# Patient Record
Sex: FEMALE | Race: WHITE | Employment: OTHER | URBAN - NONMETROPOLITAN AREA
[De-identification: names, ages, dates, MRNs, and addresses within clinical notes are randomized per-mention and may not be internally consistent; named-entity substitution may affect disease eponyms.]

---

## 2018-09-14 ENCOUNTER — APPOINTMENT (OUTPATIENT)
Dept: GENERAL RADIOLOGY | Age: 78
DRG: 640 | End: 2018-09-14
Payer: MEDICARE

## 2018-09-14 ENCOUNTER — HOSPITAL ENCOUNTER (INPATIENT)
Age: 78
LOS: 3 days | Discharge: NURSING, REHAB OR LONG-TERM CARE | DRG: 640 | End: 2018-09-17
Attending: INTERNAL MEDICINE | Admitting: INTERNAL MEDICINE
Payer: MEDICARE

## 2018-09-14 ENCOUNTER — APPOINTMENT (OUTPATIENT)
Dept: CT IMAGING | Age: 78
DRG: 640 | End: 2018-09-14
Payer: MEDICARE

## 2018-09-14 DIAGNOSIS — E86.0 DEHYDRATION: ICD-10-CM

## 2018-09-14 DIAGNOSIS — G93.41 METABOLIC ENCEPHALOPATHY: ICD-10-CM

## 2018-09-14 DIAGNOSIS — R41.82 ALTERED MENTAL STATUS, UNSPECIFIED ALTERED MENTAL STATUS TYPE: Primary | ICD-10-CM

## 2018-09-14 DIAGNOSIS — N39.0 URINARY TRACT INFECTION WITHOUT HEMATURIA, SITE UNSPECIFIED: ICD-10-CM

## 2018-09-14 PROBLEM — F02.80 ALZHEIMER'S DEMENTIA (HCC): Chronic | Status: ACTIVE | Noted: 2018-09-14

## 2018-09-14 PROBLEM — G30.9 ALZHEIMER'S DEMENTIA (HCC): Status: ACTIVE | Noted: 2018-09-14

## 2018-09-14 PROBLEM — R79.89 PRERENAL AZOTEMIA: Status: ACTIVE | Noted: 2018-09-14

## 2018-09-14 PROBLEM — G30.9 ALZHEIMER'S DEMENTIA (HCC): Chronic | Status: ACTIVE | Noted: 2018-09-14

## 2018-09-14 PROBLEM — F02.80 ALZHEIMER'S DEMENTIA (HCC): Status: ACTIVE | Noted: 2018-09-14

## 2018-09-14 LAB
-: ABNORMAL
ABSOLUTE EOS #: 0.44 K/UL (ref 0–0.44)
ABSOLUTE IMMATURE GRANULOCYTE: <0.03 K/UL (ref 0–0.3)
ABSOLUTE LYMPH #: 1.85 K/UL (ref 1.1–3.7)
ABSOLUTE MONO #: 0.44 K/UL (ref 0.1–1.2)
ALBUMIN SERPL-MCNC: 3.6 G/DL (ref 3.5–5.2)
ALBUMIN/GLOBULIN RATIO: 1.4 (ref 1–2.5)
ALP BLD-CCNC: 82 U/L (ref 35–104)
ALT SERPL-CCNC: 7 U/L (ref 5–33)
AMORPHOUS: ABNORMAL
ANION GAP SERPL CALCULATED.3IONS-SCNC: 7 MMOL/L (ref 9–17)
AST SERPL-CCNC: 12 U/L
BACTERIA: ABNORMAL
BASOPHILS # BLD: 1 % (ref 0–2)
BASOPHILS ABSOLUTE: 0.06 K/UL (ref 0–0.2)
BILIRUB SERPL-MCNC: 0.36 MG/DL (ref 0.3–1.2)
BILIRUBIN URINE: NEGATIVE
BUN BLDV-MCNC: 30 MG/DL (ref 8–23)
BUN/CREAT BLD: 28 (ref 9–20)
CALCIUM SERPL-MCNC: 9.2 MG/DL (ref 8.6–10.4)
CASTS UA: ABNORMAL /LPF
CHLORIDE BLD-SCNC: 109 MMOL/L (ref 98–107)
CO2: 32 MMOL/L (ref 20–31)
COLOR: YELLOW
COMMENT UA: ABNORMAL
CREAT SERPL-MCNC: 1.08 MG/DL (ref 0.5–0.9)
CRYSTALS, UA: ABNORMAL /HPF
DIFFERENTIAL TYPE: ABNORMAL
EOSINOPHILS RELATIVE PERCENT: 7 % (ref 1–4)
EPITHELIAL CELLS UA: ABNORMAL /HPF (ref 0–25)
GFR AFRICAN AMERICAN: 59 ML/MIN
GFR NON-AFRICAN AMERICAN: 49 ML/MIN
GFR SERPL CREATININE-BSD FRML MDRD: ABNORMAL ML/MIN/{1.73_M2}
GFR SERPL CREATININE-BSD FRML MDRD: ABNORMAL ML/MIN/{1.73_M2}
GLUCOSE BLD-MCNC: 86 MG/DL (ref 70–99)
GLUCOSE URINE: NEGATIVE
HCT VFR BLD CALC: 39.7 % (ref 36.3–47.1)
HEMOGLOBIN: 12.5 G/DL (ref 11.9–15.1)
IMMATURE GRANULOCYTES: 0 %
KETONES, URINE: NEGATIVE
LACTIC ACID, WHOLE BLOOD: NORMAL MMOL/L (ref 0.7–2.1)
LACTIC ACID: 0.9 MMOL/L (ref 0.5–2.2)
LEUKOCYTE ESTERASE, URINE: ABNORMAL
LYMPHOCYTES # BLD: 30 % (ref 24–43)
MCH RBC QN AUTO: 31.1 PG (ref 25.2–33.5)
MCHC RBC AUTO-ENTMCNC: 31.5 G/DL (ref 28.4–34.8)
MCV RBC AUTO: 98.8 FL (ref 82.6–102.9)
MONOCYTES # BLD: 7 % (ref 3–12)
MUCUS: ABNORMAL
NITRITE, URINE: POSITIVE
NRBC AUTOMATED: 0 PER 100 WBC
OTHER OBSERVATIONS UA: ABNORMAL
PDW BLD-RTO: 13 % (ref 11.8–14.4)
PH UA: 6.5 (ref 5–9)
PLATELET # BLD: 227 K/UL (ref 138–453)
PLATELET ESTIMATE: ABNORMAL
PMV BLD AUTO: 11.7 FL (ref 8.1–13.5)
POTASSIUM SERPL-SCNC: 4.1 MMOL/L (ref 3.7–5.3)
PROTEIN UA: NEGATIVE
RBC # BLD: 4.02 M/UL (ref 3.95–5.11)
RBC # BLD: ABNORMAL 10*6/UL
RBC UA: ABNORMAL /HPF (ref 0–2)
RENAL EPITHELIAL, UA: ABNORMAL /HPF
SEG NEUTROPHILS: 55 % (ref 36–65)
SEGMENTED NEUTROPHILS ABSOLUTE COUNT: 3.39 K/UL (ref 1.5–8.1)
SODIUM BLD-SCNC: 148 MMOL/L (ref 135–144)
SPECIFIC GRAVITY UA: 1.02 (ref 1.01–1.02)
TOTAL PROTEIN: 6.1 G/DL (ref 6.4–8.3)
TRICHOMONAS: ABNORMAL
TROPONIN INTERP: NORMAL
TROPONIN T: <0.03 NG/ML
TURBIDITY: ABNORMAL
URINE HGB: ABNORMAL
UROBILINOGEN, URINE: NORMAL
WBC # BLD: 6.2 K/UL (ref 3.5–11.3)
WBC # BLD: ABNORMAL 10*3/UL
WBC UA: ABNORMAL /HPF (ref 0–5)
YEAST: ABNORMAL

## 2018-09-14 PROCEDURE — 70450 CT HEAD/BRAIN W/O DYE: CPT

## 2018-09-14 PROCEDURE — 84484 ASSAY OF TROPONIN QUANT: CPT

## 2018-09-14 PROCEDURE — 87086 URINE CULTURE/COLONY COUNT: CPT

## 2018-09-14 PROCEDURE — 6360000002 HC RX W HCPCS: Performed by: INTERNAL MEDICINE

## 2018-09-14 PROCEDURE — 2580000003 HC RX 258: Performed by: INTERNAL MEDICINE

## 2018-09-14 PROCEDURE — 71045 X-RAY EXAM CHEST 1 VIEW: CPT

## 2018-09-14 PROCEDURE — 99285 EMERGENCY DEPT VISIT HI MDM: CPT

## 2018-09-14 PROCEDURE — 85025 COMPLETE CBC W/AUTO DIFF WBC: CPT

## 2018-09-14 PROCEDURE — 36415 COLL VENOUS BLD VENIPUNCTURE: CPT

## 2018-09-14 PROCEDURE — 6360000002 HC RX W HCPCS: Performed by: PHYSICIAN ASSISTANT

## 2018-09-14 PROCEDURE — 2580000003 HC RX 258: Performed by: PHYSICIAN ASSISTANT

## 2018-09-14 PROCEDURE — 96360 HYDRATION IV INFUSION INIT: CPT

## 2018-09-14 PROCEDURE — 1200000000 HC SEMI PRIVATE

## 2018-09-14 PROCEDURE — 81001 URINALYSIS AUTO W/SCOPE: CPT

## 2018-09-14 PROCEDURE — 93005 ELECTROCARDIOGRAM TRACING: CPT

## 2018-09-14 PROCEDURE — 87186 SC STD MICRODIL/AGAR DIL: CPT

## 2018-09-14 PROCEDURE — 80053 COMPREHEN METABOLIC PANEL: CPT

## 2018-09-14 PROCEDURE — 6370000000 HC RX 637 (ALT 250 FOR IP): Performed by: INTERNAL MEDICINE

## 2018-09-14 PROCEDURE — 87040 BLOOD CULTURE FOR BACTERIA: CPT

## 2018-09-14 PROCEDURE — 82947 ASSAY GLUCOSE BLOOD QUANT: CPT

## 2018-09-14 PROCEDURE — 87077 CULTURE AEROBIC IDENTIFY: CPT

## 2018-09-14 PROCEDURE — 83605 ASSAY OF LACTIC ACID: CPT

## 2018-09-14 RX ORDER — SODIUM CHLORIDE 0.9 % (FLUSH) 0.9 %
10 SYRINGE (ML) INJECTION EVERY 12 HOURS SCHEDULED
Status: DISCONTINUED | OUTPATIENT
Start: 2018-09-14 | End: 2018-09-17 | Stop reason: HOSPADM

## 2018-09-14 RX ORDER — PAROXETINE HYDROCHLORIDE 20 MG/1
20 TABLET, FILM COATED ORAL EVERY MORNING
COMMUNITY

## 2018-09-14 RX ORDER — DONEPEZIL HYDROCHLORIDE 10 MG/1
10 TABLET, ORALLY DISINTEGRATING ORAL DAILY
COMMUNITY

## 2018-09-14 RX ORDER — DONEPEZIL HYDROCHLORIDE 5 MG/1
10 TABLET, FILM COATED ORAL DAILY
Status: DISCONTINUED | OUTPATIENT
Start: 2018-09-15 | End: 2018-09-17 | Stop reason: HOSPADM

## 2018-09-14 RX ORDER — SODIUM CHLORIDE 0.9 % (FLUSH) 0.9 %
10 SYRINGE (ML) INJECTION PRN
Status: DISCONTINUED | OUTPATIENT
Start: 2018-09-14 | End: 2018-09-17 | Stop reason: HOSPADM

## 2018-09-14 RX ORDER — SODIUM CHLORIDE 9 MG/ML
INJECTION, SOLUTION INTRAVENOUS CONTINUOUS
Status: DISCONTINUED | OUTPATIENT
Start: 2018-09-14 | End: 2018-09-15

## 2018-09-14 RX ORDER — QUETIAPINE FUMARATE 25 MG/1
25 TABLET, FILM COATED ORAL 2 TIMES DAILY
COMMUNITY

## 2018-09-14 RX ORDER — MEMANTINE HYDROCHLORIDE 10 MG/1
28 TABLET ORAL DAILY
Status: DISCONTINUED | OUTPATIENT
Start: 2018-09-15 | End: 2018-09-14 | Stop reason: CLARIF

## 2018-09-14 RX ORDER — QUETIAPINE FUMARATE 25 MG/1
25 TABLET, FILM COATED ORAL
Status: DISCONTINUED | OUTPATIENT
Start: 2018-09-14 | End: 2018-09-17 | Stop reason: HOSPADM

## 2018-09-14 RX ORDER — MEMANTINE HYDROCHLORIDE 10 MG/1
10 TABLET ORAL 2 TIMES DAILY
Status: DISCONTINUED | OUTPATIENT
Start: 2018-09-14 | End: 2018-09-17 | Stop reason: HOSPADM

## 2018-09-14 RX ORDER — FAMOTIDINE 20 MG/1
20 TABLET, FILM COATED ORAL DAILY
Status: DISCONTINUED | OUTPATIENT
Start: 2018-09-15 | End: 2018-09-17

## 2018-09-14 RX ORDER — OXYBUTYNIN CHLORIDE 5 MG/1
5 TABLET, EXTENDED RELEASE ORAL DAILY
COMMUNITY

## 2018-09-14 RX ORDER — CYANOCOBALAMIN (VITAMIN B-12) 1000 MCG
1000 TABLET, EXTENDED RELEASE ORAL DAILY
COMMUNITY

## 2018-09-14 RX ORDER — MEMANTINE HYDROCHLORIDE 10 MG/1
28 TABLET ORAL DAILY
Status: ON HOLD | COMMUNITY
End: 2018-11-12 | Stop reason: CLARIF

## 2018-09-14 RX ORDER — OXYBUTYNIN CHLORIDE 5 MG/1
5 TABLET, EXTENDED RELEASE ORAL DAILY
Status: DISCONTINUED | OUTPATIENT
Start: 2018-09-15 | End: 2018-09-17 | Stop reason: HOSPADM

## 2018-09-14 RX ORDER — CIPROFLOXACIN 2 MG/ML
400 INJECTION, SOLUTION INTRAVENOUS EVERY 12 HOURS
Status: DISCONTINUED | OUTPATIENT
Start: 2018-09-14 | End: 2018-09-17 | Stop reason: HOSPADM

## 2018-09-14 RX ORDER — ONDANSETRON 2 MG/ML
4 INJECTION INTRAMUSCULAR; INTRAVENOUS EVERY 6 HOURS PRN
Status: DISCONTINUED | OUTPATIENT
Start: 2018-09-14 | End: 2018-09-17 | Stop reason: HOSPADM

## 2018-09-14 RX ORDER — SODIUM CHLORIDE 9 MG/ML
INJECTION, SOLUTION INTRAVENOUS CONTINUOUS
Status: DISCONTINUED | OUTPATIENT
Start: 2018-09-14 | End: 2018-09-17 | Stop reason: HOSPADM

## 2018-09-14 RX ORDER — PAROXETINE HYDROCHLORIDE 20 MG/1
20 TABLET, FILM COATED ORAL EVERY MORNING
Status: DISCONTINUED | OUTPATIENT
Start: 2018-09-15 | End: 2018-09-17 | Stop reason: HOSPADM

## 2018-09-14 RX ADMIN — QUETIAPINE FUMARATE 25 MG: 25 TABLET ORAL at 23:37

## 2018-09-14 RX ADMIN — SODIUM CHLORIDE: 9 INJECTION, SOLUTION INTRAVENOUS at 23:39

## 2018-09-14 RX ADMIN — SODIUM CHLORIDE: 9 INJECTION, SOLUTION INTRAVENOUS at 19:29

## 2018-09-14 RX ADMIN — CEFTRIAXONE 1 G: 1 INJECTION, POWDER, FOR SOLUTION INTRAMUSCULAR; INTRAVENOUS at 21:33

## 2018-09-14 RX ADMIN — CIPROFLOXACIN 400 MG: 2 INJECTION, SOLUTION INTRAVENOUS at 23:37

## 2018-09-14 RX ADMIN — MEMANTINE HYDROCHLORIDE 10 MG: 10 TABLET, FILM COATED ORAL at 23:37

## 2018-09-14 NOTE — ED NOTES
Bed:  01A  Expected date:   Expected time:   Means of arrival:   Comments:  Unresponsive       Balaji Koo RN  09/14/18 6757

## 2018-09-14 NOTE — ED PROVIDER NOTES
[facility] ED  eMERGENCY dEPARTMENT eNCOUnter      Pt Name: Carol Roman  MRN: [id number]  [place] 1940  Date of evaluation: 9/14/2018  Provider: Elisabeth Green PA-C,    CHIEF COMPLAINT       Chief Complaint   Patient presents with    Altered Mental Status      told family last known well last night, fighting when attempting to open eyes       HISTORY OF [address] Cliff Smith is a 66 y.o. female who presents to the emergency department from home EMS as a history dementia went to bed last evening at in her normal self this morning patient was unresponsive. EMS states when they got there patient would open her eyes but only stares straight ahead would not track anybody in the room. No other symptomology is not this time. Triage notes and Nursing notes were reviewed by myself. Any discrepancies are addressed above. PAST MEDICAL HISTORY     Past Medical History:   Diagnosis Date    Alzheimer's dementia        SURGICAL HISTORY       Past Surgical History:   Procedure Laterality Date    APPENDECTOMY      KNEE ARTHROSCOPY         CURRENT MEDICATIONS       Previous Medications    CYANOCOBALAMIN (VITAMIN B-12) 1000 MCG EXTENDED RELEASE TABLET    Take 1,000 mcg by mouth daily    DONEPEZIL (ARICEPT ODT) 10 MG DISINTEGRATING TABLET    Take 10 mg by mouth daily    MEMANTINE (NAMENDA) 10 MG TABLET    Take 28 mg by mouth daily    OXYBUTYNIN (DITROPAN-XL) 5 MG EXTENDED RELEASE TABLET    Take 5 mg by mouth daily    PAROXETINE (PAXIL) 20 MG TABLET    Take 20 mg by mouth every morning    QUETIAPINE (SEROQUEL) 25 MG TABLET    Take 25 mg by mouth Daily with supper       ALLERGIES     Penicillins    FAMILY HISTORY     History reviewed. No pertinent family history.      SOCIAL HISTORY       Social History     Social History    Marital status:      Spouse name: N/A    Number of children: N/A    Years of education: N/A     Social History Main Topics    Smoking status: Former Smoker    Smokeless tobacco: Never Used    Alcohol use No    Drug use: No    Sexual activity: Not Asked     Other Topics Concern    None     Social History Narrative    None       REVIEW OF SYSTEMS       Review of Systems   Unable to perform ROS: Dementia   Neurological:        See HPI     Review of systems as in HPI & PMH otherwise negative    PHYSICAL EXAM    (up to 7 for level 4, 8 or more for level 5)     ED Triage Vitals [09/14/18 1652]   BP Temp Temp src Pulse Resp SpO2 Height Weight   (!) 193/77 97.8 °F (36.6 °C) -- 58 19 95 % -- --       Physical Exam   Constitutional: She appears well-developed and well-nourished. HENT:   Head: Normocephalic and atraumatic. Dry oral mucosa   Eyes: Conjunctivae are normal.   Neck: Normal range of motion. Neck supple. Cardiovascular: Normal rate and regular rhythm. Pulmonary/Chest: Effort normal and breath sounds normal.   Neurological:   Patient will open her eyes and noxious stimuli   Skin: Skin is warm and dry. Nursing note and vitals reviewed. DIAGNOSTIC RESULTS     EKG: (none if blank)  All EKG's are interpreted by the Emergency Department Physician who either signs or Co-signs this chart in the absence of a cardiologist.    Normal sinus rhythm without ischemic changes    RADIOLOGY: (none if blank)   Interpretation per the Radiologist below, if available at the time of this note:    CT Head WO Contrast   Final Result   Nonspecific high density in the basilar terminus. This may be artifact or   calcification. A small amount of thrombus would be difficult to exclude   given the patient's history of clinical suspicion for stroke. CT angiogram   may be helpful      No acute intracranial abnormality otherwise noted      Critical results were called by Dr. Stacia Toscano to Radha Santos on 9/14/2018   at 19:30. XR CHEST 1 VW   Final Result   No acute findings.              LABS:  Labs Reviewed   CBC WITH AUTO DIFFERENTIAL - Abnormal; Notable for the following:        Result Value    Eosinophils % 7 (*)     All other components within normal limits   COMPREHENSIVE METABOLIC PANEL - Abnormal; Notable for the following:     BUN 30 (*)     CREATININE 1.08 (*)     Bun/Cre Ratio 28 (*)     Sodium 148 (*)     Chloride 109 (*)     CO2 32 (*)     Anion Gap 7 (*)     Total Protein 6.1 (*)     GFR Non- 49 (*)     GFR  59 (*)     All other components within normal limits   URINALYSIS - Abnormal; Notable for the following:     Turbidity UA SLIGHTLY CLOUDY (*)     Urine Hgb TRACE (*)     Nitrite, Urine POSITIVE (*)     Leukocyte Esterase, Urine SMALL (*)     All other components within normal limits   MICROSCOPIC URINALYSIS - Abnormal; Notable for the following:     Bacteria, UA 3+ (*)     All other components within normal limits   CULTURE BLOOD #1   CULTURE BLOOD #2   LACTIC ACID, PLASMA   TROPONIN       All other labs were within normal range or not returned as of this dictation.     EMERGENCY DEPARTMENT COURSE and Medical Decision Making:     MDM  Number of Diagnoses or Management Options  Altered mental status, unspecified altered mental status type: new, needed workup  Dehydration: new, needed workup  Metabolic encephalopathy: new, needed workup  Urinary tract infection without hematuria, site unspecified: new, needed workup     Amount and/or Complexity of Data Reviewed  Clinical lab tests: ordered  Tests in the radiology section of CPT®: ordered  Tests in the medicine section of CPT®: ordered  Discussion of test results with the performing providers: yes  Decide to obtain previous medical records or to obtain history from someone other than the patient: yes  Obtain history from someone other than the patient: yes  Discuss the patient with other providers: yes  Independent visualization of images, tracings, or specimens: yes    Risk of Complications, Morbidity, and/or Mortality  Presenting problems: high  Diagnostic procedures: high  Management options: high    Critical Care  Total time providing critical care: 30-74 minutes  /     Nurse's notes reviewed as were vital signs. Labs were obtained and results reviewed showed a patient was somewhat dehydrated. Tempted cath urine ×2 and was unable to get a urine from the bladder. Patient is given 1 L IV bolus normal saline patient's nonresponsive she'll talk and were able to get some urine which showed this patient have a urinary tract infection. Patient will be given 1 g Rocephin case discussed with hospitalist for admission       Strict return precautions and follow up instructions were discussed with the patient with which the patient agrees    ED Medications administered this visit:    Medications   0.9 % sodium chloride infusion ( Intravenous Stopped 9/14/18 2035)   cefTRIAXone (ROCEPHIN) 1 g in sterile water 10 mL IV syringe (not administered)       CONSULTS: (None if blank)  None    Procedures: (None if blank)       CLINICAL IMPRESSION      1. Altered mental status, unspecified altered mental status type    2. Urinary tract infection without hematuria, site unspecified    3. Metabolic encephalopathy    4. Dehydration          DISPOSITION/PLAN   DISPOSITION        PATIENT REFERRED TO:  No follow-up provider specified.     DISCHARGE MEDICATIONS:  New Prescriptions    No medications on file              (Please note that portions of this note were completed with a voice recognition program.  Efforts were made to edit the dictations but occasionally words are mis-transcribed.)      Electronically signed by Lisa Ordoñez PA-C on 9/14/18 at 6:54 PM             Lisa Ordoñez PA-C  09/14/18 2118       Lisa Ordoñez PA-C  09/14/18 2118       Lisa Ordoñez PA-C  09/14/18 2131

## 2018-09-14 NOTE — ED NOTES
Pt saturated with stool and urine, pt cleaned up, small scab noted on coccyx, pt repositioned     Maurizio Salomon RN  09/14/18 4010

## 2018-09-15 LAB
ABSOLUTE EOS #: 0.4 K/UL (ref 0–0.44)
ABSOLUTE IMMATURE GRANULOCYTE: <0.03 K/UL (ref 0–0.3)
ABSOLUTE LYMPH #: 1.6 K/UL (ref 1.1–3.7)
ABSOLUTE MONO #: 0.4 K/UL (ref 0.1–1.2)
ANION GAP SERPL CALCULATED.3IONS-SCNC: 8 MMOL/L (ref 9–17)
BASOPHILS # BLD: 1 % (ref 0–2)
BASOPHILS ABSOLUTE: 0.05 K/UL (ref 0–0.2)
BUN BLDV-MCNC: 22 MG/DL (ref 8–23)
BUN/CREAT BLD: 30 (ref 9–20)
CALCIUM SERPL-MCNC: 8.2 MG/DL (ref 8.6–10.4)
CHLORIDE BLD-SCNC: 109 MMOL/L (ref 98–107)
CO2: 27 MMOL/L (ref 20–31)
CREAT SERPL-MCNC: 0.74 MG/DL (ref 0.5–0.9)
DIFFERENTIAL TYPE: ABNORMAL
EKG ATRIAL RATE: 60 BPM
EKG P AXIS: 93 DEGREES
EKG P-R INTERVAL: 134 MS
EKG Q-T INTERVAL: 436 MS
EKG QRS DURATION: 86 MS
EKG QTC CALCULATION (BAZETT): 436 MS
EKG R AXIS: 30 DEGREES
EKG T AXIS: 66 DEGREES
EKG VENTRICULAR RATE: 60 BPM
EOSINOPHILS RELATIVE PERCENT: 6 % (ref 1–4)
GFR AFRICAN AMERICAN: >60 ML/MIN
GFR NON-AFRICAN AMERICAN: >60 ML/MIN
GFR SERPL CREATININE-BSD FRML MDRD: ABNORMAL ML/MIN/{1.73_M2}
GFR SERPL CREATININE-BSD FRML MDRD: ABNORMAL ML/MIN/{1.73_M2}
GLUCOSE BLD-MCNC: 70 MG/DL (ref 74–100)
GLUCOSE BLD-MCNC: 90 MG/DL (ref 70–99)
HCT VFR BLD CALC: 37.3 % (ref 36.3–47.1)
HEMOGLOBIN: 11.6 G/DL (ref 11.9–15.1)
IMMATURE GRANULOCYTES: 0 %
LYMPHOCYTES # BLD: 25 % (ref 24–43)
MCH RBC QN AUTO: 30.7 PG (ref 25.2–33.5)
MCHC RBC AUTO-ENTMCNC: 31.1 G/DL (ref 28.4–34.8)
MCV RBC AUTO: 98.7 FL (ref 82.6–102.9)
MONOCYTES # BLD: 6 % (ref 3–12)
NRBC AUTOMATED: 0 PER 100 WBC
PDW BLD-RTO: 12.8 % (ref 11.8–14.4)
PLATELET # BLD: 204 K/UL (ref 138–453)
PLATELET ESTIMATE: ABNORMAL
PMV BLD AUTO: 11.3 FL (ref 8.1–13.5)
POTASSIUM SERPL-SCNC: 3.8 MMOL/L (ref 3.7–5.3)
RBC # BLD: 3.78 M/UL (ref 3.95–5.11)
RBC # BLD: ABNORMAL 10*6/UL
SEG NEUTROPHILS: 62 % (ref 36–65)
SEGMENTED NEUTROPHILS ABSOLUTE COUNT: 3.93 K/UL (ref 1.5–8.1)
SODIUM BLD-SCNC: 144 MMOL/L (ref 135–144)
WBC # BLD: 6.4 K/UL (ref 3.5–11.3)
WBC # BLD: ABNORMAL 10*3/UL

## 2018-09-15 PROCEDURE — 36415 COLL VENOUS BLD VENIPUNCTURE: CPT

## 2018-09-15 PROCEDURE — 2580000003 HC RX 258: Performed by: INTERNAL MEDICINE

## 2018-09-15 PROCEDURE — 1200000000 HC SEMI PRIVATE

## 2018-09-15 PROCEDURE — 85025 COMPLETE CBC W/AUTO DIFF WBC: CPT

## 2018-09-15 PROCEDURE — 6360000002 HC RX W HCPCS: Performed by: INTERNAL MEDICINE

## 2018-09-15 PROCEDURE — 80048 BASIC METABOLIC PNL TOTAL CA: CPT

## 2018-09-15 PROCEDURE — 6370000000 HC RX 637 (ALT 250 FOR IP): Performed by: INTERNAL MEDICINE

## 2018-09-15 RX ADMIN — MEMANTINE HYDROCHLORIDE 10 MG: 10 TABLET, FILM COATED ORAL at 08:33

## 2018-09-15 RX ADMIN — ENOXAPARIN SODIUM 40 MG: 40 INJECTION SUBCUTANEOUS at 08:33

## 2018-09-15 RX ADMIN — CIPROFLOXACIN 400 MG: 2 INJECTION, SOLUTION INTRAVENOUS at 10:56

## 2018-09-15 RX ADMIN — PAROXETINE HYDROCHLORIDE 20 MG: 20 TABLET, FILM COATED ORAL at 08:33

## 2018-09-15 RX ADMIN — FAMOTIDINE 20 MG: 20 TABLET ORAL at 08:33

## 2018-09-15 RX ADMIN — MEMANTINE HYDROCHLORIDE 10 MG: 10 TABLET, FILM COATED ORAL at 20:15

## 2018-09-15 RX ADMIN — QUETIAPINE FUMARATE 25 MG: 25 TABLET ORAL at 17:23

## 2018-09-15 RX ADMIN — SODIUM CHLORIDE: 9 INJECTION, SOLUTION INTRAVENOUS at 13:12

## 2018-09-15 RX ADMIN — OXYBUTYNIN CHLORIDE 5 MG: 5 TABLET, EXTENDED RELEASE ORAL at 08:33

## 2018-09-15 RX ADMIN — CIPROFLOXACIN 400 MG: 2 INJECTION, SOLUTION INTRAVENOUS at 23:56

## 2018-09-15 RX ADMIN — DONEPEZIL HYDROCHLORIDE 10 MG: 5 TABLET, FILM COATED ORAL at 08:34

## 2018-09-15 NOTE — PLAN OF CARE
Problem: Falls - Risk of:  Goal: Will remain free from falls  Will remain free from falls   Falling star program in place. Fall risk calculated at [de-identified]. Patient alert, disoriented, with history of dementia. Bed alarm/restraint free alarm in use at all times. Frequent rounding. No Falls as of present. Goal: Absence of physical injury  Absence of physical injury   Outcome: Ongoing  Miranda fall score calculated on admission and daily at midnight and shows pt at 80 risk for fall. Bed in lowest position at all times with wheels locked. Bed alarm active. Falling star posted on door frame and yellow sticker visible on patient bracelet. Call light and bedside table with in reach. ID information verified as correct and visible. Will continue to monitor and intervene as necessary. Hafsa Milligan RN      Problem: Risk for Impaired Skin Integrity  Goal: Tissue integrity - skin and mucous membranes  Structural intactness and normal physiological function of skin and  mucous membranes. Outcome: Ongoing  Tristen score calculated at 15. Patient turns self with assistance. Depends in use for incontinence. No skin issues noted at present. Problem: Fluid Volume - Imbalance:  Intervention: Assess signs and symptoms of dehydration  Mucous membranes moist.  IV fluids infusing as ordered. Patient taking oral fluids well. Needing assistance with meals. Problem: Mental Status - Impaired:  Intervention: Assess readiness for discharge  Patient easily arouses to name spoken this AM.  Alert. Disoriented to date, time, surroundings, and situation. Cooperative and follows instructions.

## 2018-09-15 NOTE — H&P
Potassium Latest Ref Range: 3.7 - 5.3 mmol/L 4.1   Chloride Latest Ref Range: 98 - 107 mmol/L 109 (H)   CO2 Latest Ref Range: 20 - 31 mmol/L 32 (H)   BUN Latest Ref Range: 8 - 23 mg/dL 30 (H)   Creatinine Latest Ref Range: 0.50 - 0.90 mg/dL 1.08 (H)   Bun/Cre Ratio Latest Ref Range: 9 - 20  28 (H)   Anion Gap Latest Ref Range: 9 - 17 mmol/L 7 (L)   GFR Non- Latest Ref Range: >60 mL/min 49 (L)   GFR  Latest Ref Range: >60 mL/min 59 (L)   Lactic Acid Latest Ref Range: 0.5 - 2.2 mmol/L 0.9     Results for Daniella Ramirez (MRN [id number]) as of 9/15/2018 08:43   Ref.  Range 9/14/2018 16:30   WBC Latest Ref Range: 3.5 - 11.3 k/uL 6.2   RBC Latest Ref Range: 3.95 - 5.11 m/uL 4.02   Hemoglobin Quant Latest Ref Range: 11.9 - 15.1 g/dL 12.5   Hematocrit Latest Ref Range: 36.3 - 47.1 % 39.7   MCV Latest Ref Range: 82.6 - 102.9 fL 98.8   MCH Latest Ref Range: 25.2 - 33.5 pg 31.1   MCHC Latest Ref Range: 28.4 - 34.8 g/dL 31.5   MPV Latest Ref Range: 8.1 - 13.5 fL 11.7   RDW Latest Ref Range: 11.8 - 14.4 % 13.0   Platelet Count Latest Ref Range: 138 - 453 k/uL 227   Absolute Mono # Latest Ref Range: 0.10 - 1.20 k/uL 0.44   Eosinophils % Latest Ref Range: 1 - 4 % 7 (H)   Basophils # Latest Ref Range: 0.00 - 0.20 k/uL 0.06   Seg Neutrophils Latest Ref Range: 36 - 65 % 55   Segs Absolute Latest Ref Range: 1.50 - 8.10 k/uL 3.39   Lymphocytes Latest Ref Range: 24 - 43 % 30   Absolute Lymph # Latest Ref Range: 1.10 - 3.70 k/uL 1.85   Monocytes Latest Ref Range: 3 - 12 % 7   Absolute Eos # Latest Ref Range: 0.00 - 0.44 k/uL 0.44   Basophils Latest Ref Range: 0 - 2 % 1   Immature Granulocytes Latest Ref Range: 0 % 0       Narrative   EXAMINATION:   CT OF THE HEAD WITHOUT CONTRAST  9/14/2018 6:41 pm       TECHNIQUE:   CT of the head was performed without the administration of intravenous   contrast. Dose modulation, iterative reconstruction, and/or weight based   adjustment of the mA/kV was utilized to reduce the radiation dose to as low   as reasonably achievable.       COMPARISON:   None.       HISTORY:   ORDERING SYSTEM PROVIDED HISTORY: Unresponsive   TECHNOLOGIST PROVIDED HISTORY:           FINDINGS:   BRAIN/VENTRICLES: Ventricles and sulci are prominent.  Ventricles are   midline.  Minimal low-density in the periventricular white matter is noted   bilaterally.  Otherwise, gray-white differentiation is preserved.  There is   no midline shift, mass effect, hemorrhage or extra-axial collection. Relative increased density in the basilar terminus is noted.  Vascular   calcifications are noted in the cavernous carotid segments.  Posterior fossa   detail is slightly limited due to artifact.  There is no definite focus of   abnormal density in the cerebellum or the gibran.  There is no mass effect on   the 4th ventricle       ORBITS: The visualized portion of the orbits demonstrate no acute abnormality.       SINUSES: The visualized paranasal sinuses and mastoid air cells demonstrate   no acute abnormality.       SOFT TISSUES/SKULL:  Hyperostosis of the skull is noted.       Impression   Nonspecific high density in the basilar terminus.  This may be artifact or   calcification.  A small amount of thrombus would be difficult to exclude   given the patient's history of clinical suspicion for stroke.  CT angiogram   may be helpful       No acute intracranial abnormality otherwise noted       Critical results were called by Dr. Jelani Marcus to Tony Marcelino on 9/14/2018   at 19:30.           · EKG reviewed: my interpretation is NSR with PAC's, normal axis, normal intervals, non-specific ST changes      PROBLEM LIST:  Principal Problem:    Altered mental status  Active Problems:    UTI (urinary tract infection)    Dehydration    Prerenal azotemia    Alzheimer's dementia  Resolved Problems:    * No resolved hospital problems.  *      ASSESSMENT / PLAN:  Altered mental status / metabolic encephalopathy  · Improving compared

## 2018-09-16 LAB
ABSOLUTE EOS #: 0.38 K/UL (ref 0–0.44)
ABSOLUTE IMMATURE GRANULOCYTE: <0.03 K/UL (ref 0–0.3)
ABSOLUTE LYMPH #: 1.89 K/UL (ref 1.1–3.7)
ABSOLUTE MONO #: 0.46 K/UL (ref 0.1–1.2)
ANION GAP SERPL CALCULATED.3IONS-SCNC: 5 MMOL/L (ref 9–17)
BASOPHILS # BLD: 1 % (ref 0–2)
BASOPHILS ABSOLUTE: 0.04 K/UL (ref 0–0.2)
BUN BLDV-MCNC: 16 MG/DL (ref 8–23)
BUN/CREAT BLD: 18 (ref 9–20)
CALCIUM SERPL-MCNC: 8.2 MG/DL (ref 8.6–10.4)
CHLORIDE BLD-SCNC: 105 MMOL/L (ref 98–107)
CO2: 30 MMOL/L (ref 20–31)
CREAT SERPL-MCNC: 0.9 MG/DL (ref 0.5–0.9)
CULTURE: ABNORMAL
DIFFERENTIAL TYPE: ABNORMAL
EOSINOPHILS RELATIVE PERCENT: 6 % (ref 1–4)
GFR AFRICAN AMERICAN: >60 ML/MIN
GFR NON-AFRICAN AMERICAN: >60 ML/MIN
GFR SERPL CREATININE-BSD FRML MDRD: ABNORMAL ML/MIN/{1.73_M2}
GFR SERPL CREATININE-BSD FRML MDRD: ABNORMAL ML/MIN/{1.73_M2}
GLUCOSE BLD-MCNC: 92 MG/DL (ref 70–99)
HCT VFR BLD CALC: 36.3 % (ref 36.3–47.1)
HEMOGLOBIN: 11.6 G/DL (ref 11.9–15.1)
IMMATURE GRANULOCYTES: 0 %
LYMPHOCYTES # BLD: 31 % (ref 24–43)
Lab: ABNORMAL
MCH RBC QN AUTO: 30.9 PG (ref 25.2–33.5)
MCHC RBC AUTO-ENTMCNC: 32 G/DL (ref 28.4–34.8)
MCV RBC AUTO: 96.5 FL (ref 82.6–102.9)
MONOCYTES # BLD: 8 % (ref 3–12)
NRBC AUTOMATED: 0 PER 100 WBC
ORGANISM: ABNORMAL
PDW BLD-RTO: 12.9 % (ref 11.8–14.4)
PLATELET # BLD: 219 K/UL (ref 138–453)
PLATELET ESTIMATE: ABNORMAL
PMV BLD AUTO: 11.3 FL (ref 8.1–13.5)
POTASSIUM SERPL-SCNC: 3.8 MMOL/L (ref 3.7–5.3)
RBC # BLD: 3.76 M/UL (ref 3.95–5.11)
RBC # BLD: ABNORMAL 10*6/UL
SEG NEUTROPHILS: 54 % (ref 36–65)
SEGMENTED NEUTROPHILS ABSOLUTE COUNT: 3.32 K/UL (ref 1.5–8.1)
SODIUM BLD-SCNC: 140 MMOL/L (ref 135–144)
SPECIMEN DESCRIPTION: ABNORMAL
STATUS: ABNORMAL
WBC # BLD: 6.1 K/UL (ref 3.5–11.3)
WBC # BLD: ABNORMAL 10*3/UL

## 2018-09-16 PROCEDURE — 80048 BASIC METABOLIC PNL TOTAL CA: CPT

## 2018-09-16 PROCEDURE — 2580000003 HC RX 258: Performed by: INTERNAL MEDICINE

## 2018-09-16 PROCEDURE — 6370000000 HC RX 637 (ALT 250 FOR IP): Performed by: INTERNAL MEDICINE

## 2018-09-16 PROCEDURE — 36415 COLL VENOUS BLD VENIPUNCTURE: CPT

## 2018-09-16 PROCEDURE — 1200000000 HC SEMI PRIVATE

## 2018-09-16 PROCEDURE — 6360000002 HC RX W HCPCS: Performed by: INTERNAL MEDICINE

## 2018-09-16 PROCEDURE — 85025 COMPLETE CBC W/AUTO DIFF WBC: CPT

## 2018-09-16 RX ORDER — LORAZEPAM 2 MG/ML
0.5 INJECTION INTRAMUSCULAR EVERY 6 HOURS PRN
Status: DISCONTINUED | OUTPATIENT
Start: 2018-09-16 | End: 2018-09-17 | Stop reason: HOSPADM

## 2018-09-16 RX ADMIN — DONEPEZIL HYDROCHLORIDE 10 MG: 5 TABLET, FILM COATED ORAL at 08:34

## 2018-09-16 RX ADMIN — MEMANTINE HYDROCHLORIDE 10 MG: 10 TABLET, FILM COATED ORAL at 08:34

## 2018-09-16 RX ADMIN — FAMOTIDINE 20 MG: 20 TABLET ORAL at 08:34

## 2018-09-16 RX ADMIN — CIPROFLOXACIN 400 MG: 2 INJECTION, SOLUTION INTRAVENOUS at 11:45

## 2018-09-16 RX ADMIN — OXYBUTYNIN CHLORIDE 5 MG: 5 TABLET, EXTENDED RELEASE ORAL at 08:34

## 2018-09-16 RX ADMIN — MEMANTINE HYDROCHLORIDE 10 MG: 10 TABLET, FILM COATED ORAL at 20:09

## 2018-09-16 RX ADMIN — QUETIAPINE FUMARATE 25 MG: 25 TABLET ORAL at 17:30

## 2018-09-16 RX ADMIN — LORAZEPAM 0.5 MG: 2 INJECTION INTRAMUSCULAR; INTRAVENOUS at 20:57

## 2018-09-16 RX ADMIN — PAROXETINE HYDROCHLORIDE 20 MG: 20 TABLET, FILM COATED ORAL at 08:34

## 2018-09-16 RX ADMIN — SODIUM CHLORIDE: 9 INJECTION, SOLUTION INTRAVENOUS at 16:06

## 2018-09-16 RX ADMIN — ENOXAPARIN SODIUM 40 MG: 40 INJECTION SUBCUTANEOUS at 08:35

## 2018-09-16 RX ADMIN — SODIUM CHLORIDE: 9 INJECTION, SOLUTION INTRAVENOUS at 02:37

## 2018-09-16 ASSESSMENT — PAIN SCALES - GENERAL
PAINLEVEL_OUTOF10: 0

## 2018-09-16 NOTE — PLAN OF CARE
Problem: Falls - Risk of:  Goal: Will remain free from falls  Will remain free from falls   Outcome: Ongoing  Patient is oriented to self only and does not call out for assistance appropriately. Falling star program in use with fall band in place. Non skid socks are worn by patient. Call light, and bed side table along with personal belongings are within reach of patient. Bed alarm activated and non skid socks worn by patient at this time. Will continue to monitor. Problem: Risk for Impaired Skin Integrity  Goal: Tissue integrity - skin and mucous membranes  Structural intactness and normal physiological function of skin and  mucous membranes. Outcome: Ongoing  Patient independent in repositioning but needs encouragement to turn and reposition every two hours and as needed. Skin kept clean and dry and is intact at this time. Patient is incontinent of bowel and bladder and is changed every two hours and as needed. Monitoring for skin changes every shift and as needed. No new open areas on skin or in mucous membranes noted at this time, will continue to monitor.

## 2018-09-16 NOTE — PROGRESS NOTES
Jonny Magana M.D. Internal Medicine Progress Note 9/16/18    SUBJECTIVE:    Patient seen for f/u of Altered mental status. She is not feeding herself at this time. Her  states she usually is able to feed herself at home. Still confused, but pleasant. ROS:   Unobtainable due to alzheimer's disease    OBJECTIVE:  Vitals:   Temp: 97.7 °F (36.5 °C)  BP: 132/69  Resp: 16  Pulse: 55  SpO2: 100 %    24HR INTAKE/OUTPUT:    Intake/Output Summary (Last 24 hours) at 09/16/18 1209  Last data filed at 09/16/18 0156   Gross per 24 hour   Intake           3653.3 ml   Output                0 ml   Net           3653.3 ml     -----------------------------------------------------------------  Exam:  GEN:    Awake, alert and no acute distress  EYES:  EOMI, pupils equal   NECK: Supple. No lymphadenopathy. No carotid bruit  CVS:    RRR, no murmur, rub or gallop  PULM:  CTA, no wheezes, rales or rhonchi  ABD:    Bowels sounds normal.  Abdomen is soft. No distention. No tenderness. EXT:   no edema bilaterally . No calf tenderness. NEURO: Motor and sensory are intact  SKIN:  No rashes. No skin lesions.    -----------------------------------------------------------------  Diagnostic Data:    · All available data reviewed  Lab Results   Component Value Date    WBC 6.1 09/16/2018    HGB 11.6 (L) 09/16/2018    MCV 96.5 09/16/2018     09/16/2018    Lab Results   Component Value Date    GLUCOSE 92 09/16/2018    BUN 16 09/16/2018    CREATININE 0.90 09/16/2018     09/16/2018    K 3.8 09/16/2018    CALCIUM 8.2 (L) 09/16/2018     09/16/2018    CO2 30 09/16/2018       PROBLEM LIST:  Principal Problem:    Altered mental status  Active Problems:    UTI (urinary tract infection)    Dehydration    Prerenal azotemia    Alzheimer's dementia  Resolved Problems:    * No resolved hospital problems. *      ASSESSMENT / PLAN:  · Altered mental status / metabolic encephalopathy  ?  Continue IVF's and Abx  · UTI  ? IV cipro due to pcn allergy  ? Awaiting urine culture / blood culture  · Dehydration  ? IVF hydration  · Prerenal azotemia  ? improving  · Alzheimer's dementia  ? Continue home psych meds  · Nutrition status: mild to moderate protein calorie malnutrition  · DVT prophylaxis: Lovenox   · Disposition:  discussed with  who is leaning towards long term care for Claudine Dumont, since it's getting harder for him to take care of her at home. Daughter works at [employer] and he would like her to go there.      Do Muñoz M.D.  9/16/2018  12:09 PM

## 2018-09-17 VITALS
SYSTOLIC BLOOD PRESSURE: 139 MMHG | HEART RATE: 63 BPM | BODY MASS INDEX: 22.81 KG/M2 | HEIGHT: 67 IN | OXYGEN SATURATION: 99 % | WEIGHT: 145.3 LBS | DIASTOLIC BLOOD PRESSURE: 62 MMHG | RESPIRATION RATE: 16 BRPM | TEMPERATURE: 97.1 F

## 2018-09-17 PROBLEM — G93.41 METABOLIC ENCEPHALOPATHY: Status: ACTIVE | Noted: 2018-09-17

## 2018-09-17 LAB
ABSOLUTE EOS #: 0.37 K/UL (ref 0–0.44)
ABSOLUTE IMMATURE GRANULOCYTE: <0.03 K/UL (ref 0–0.3)
ABSOLUTE LYMPH #: 1.67 K/UL (ref 1.1–3.7)
ABSOLUTE MONO #: 0.45 K/UL (ref 0.1–1.2)
ANION GAP SERPL CALCULATED.3IONS-SCNC: 6 MMOL/L (ref 9–17)
BASOPHILS # BLD: 1 % (ref 0–2)
BASOPHILS ABSOLUTE: 0.04 K/UL (ref 0–0.2)
BUN BLDV-MCNC: 11 MG/DL (ref 8–23)
BUN/CREAT BLD: 16 (ref 9–20)
CALCIUM SERPL-MCNC: 8.3 MG/DL (ref 8.6–10.4)
CHLORIDE BLD-SCNC: 109 MMOL/L (ref 98–107)
CO2: 26 MMOL/L (ref 20–31)
CREAT SERPL-MCNC: 0.68 MG/DL (ref 0.5–0.9)
DIFFERENTIAL TYPE: ABNORMAL
EOSINOPHILS RELATIVE PERCENT: 6 % (ref 1–4)
GFR AFRICAN AMERICAN: >60 ML/MIN
GFR NON-AFRICAN AMERICAN: >60 ML/MIN
GFR SERPL CREATININE-BSD FRML MDRD: ABNORMAL ML/MIN/{1.73_M2}
GFR SERPL CREATININE-BSD FRML MDRD: ABNORMAL ML/MIN/{1.73_M2}
GLUCOSE BLD-MCNC: 97 MG/DL (ref 70–99)
HCT VFR BLD CALC: 35.8 % (ref 36.3–47.1)
HEMOGLOBIN: 11.7 G/DL (ref 11.9–15.1)
IMMATURE GRANULOCYTES: 0 %
LYMPHOCYTES # BLD: 28 % (ref 24–43)
MCH RBC QN AUTO: 30.5 PG (ref 25.2–33.5)
MCHC RBC AUTO-ENTMCNC: 32.7 G/DL (ref 28.4–34.8)
MCV RBC AUTO: 93.5 FL (ref 82.6–102.9)
MONOCYTES # BLD: 8 % (ref 3–12)
NRBC AUTOMATED: 0 PER 100 WBC
PDW BLD-RTO: 12.7 % (ref 11.8–14.4)
PLATELET # BLD: 215 K/UL (ref 138–453)
PLATELET ESTIMATE: ABNORMAL
PMV BLD AUTO: 11.3 FL (ref 8.1–13.5)
POTASSIUM SERPL-SCNC: 3.6 MMOL/L (ref 3.7–5.3)
RBC # BLD: 3.83 M/UL (ref 3.95–5.11)
RBC # BLD: ABNORMAL 10*6/UL
SEG NEUTROPHILS: 57 % (ref 36–65)
SEGMENTED NEUTROPHILS ABSOLUTE COUNT: 3.5 K/UL (ref 1.5–8.1)
SODIUM BLD-SCNC: 141 MMOL/L (ref 135–144)
WBC # BLD: 6 K/UL (ref 3.5–11.3)
WBC # BLD: ABNORMAL 10*3/UL

## 2018-09-17 PROCEDURE — 90686 IIV4 VACC NO PRSV 0.5 ML IM: CPT | Performed by: INTERNAL MEDICINE

## 2018-09-17 PROCEDURE — G0008 ADMIN INFLUENZA VIRUS VAC: HCPCS | Performed by: INTERNAL MEDICINE

## 2018-09-17 PROCEDURE — 36415 COLL VENOUS BLD VENIPUNCTURE: CPT

## 2018-09-17 PROCEDURE — 80048 BASIC METABOLIC PNL TOTAL CA: CPT

## 2018-09-17 PROCEDURE — 2580000003 HC RX 258: Performed by: INTERNAL MEDICINE

## 2018-09-17 PROCEDURE — 6360000002 HC RX W HCPCS: Performed by: INTERNAL MEDICINE

## 2018-09-17 PROCEDURE — 85025 COMPLETE CBC W/AUTO DIFF WBC: CPT

## 2018-09-17 PROCEDURE — 6370000000 HC RX 637 (ALT 250 FOR IP): Performed by: INTERNAL MEDICINE

## 2018-09-17 RX ORDER — FAMOTIDINE 20 MG/1
20 TABLET, FILM COATED ORAL 2 TIMES DAILY
Status: DISCONTINUED | OUTPATIENT
Start: 2018-09-17 | End: 2018-09-17 | Stop reason: HOSPADM

## 2018-09-17 RX ORDER — CIPROFLOXACIN 500 MG/1
500 TABLET, FILM COATED ORAL 2 TIMES DAILY
Refills: 0 | DISCHARGE
Start: 2018-09-17 | End: 2018-09-24

## 2018-09-17 RX ADMIN — INFLUENZA A VIRUS A/MICHIGAN/45/2015 X-275 (H1N1) ANTIGEN (FORMALDEHYDE INACTIVATED), INFLUENZA A VIRUS A/SINGAPORE/INFIMH-16-0019/2016 IVR-186 (H3N2) ANTIGEN (FORMALDEHYDE INACTIVATED), INFLUENZA B VIRUS B/PHUKET/3073/2013 ANTIGEN (FORMALDEHYDE INACTIVATED), AND INFLUENZA B VIRUS B/MARYLAND/15/2016 BX-69A ANTIGEN (FORMALDEHYDE INACTIVATED) 0.5 ML: 15; 15; 15; 15 INJECTION, SUSPENSION INTRAMUSCULAR at 15:26

## 2018-09-17 RX ADMIN — SODIUM CHLORIDE: 9 INJECTION, SOLUTION INTRAVENOUS at 14:19

## 2018-09-17 RX ADMIN — MEMANTINE HYDROCHLORIDE 10 MG: 10 TABLET, FILM COATED ORAL at 09:17

## 2018-09-17 RX ADMIN — OXYBUTYNIN CHLORIDE 5 MG: 5 TABLET, EXTENDED RELEASE ORAL at 09:15

## 2018-09-17 RX ADMIN — FAMOTIDINE 20 MG: 20 TABLET ORAL at 09:16

## 2018-09-17 RX ADMIN — DONEPEZIL HYDROCHLORIDE 10 MG: 5 TABLET, FILM COATED ORAL at 09:15

## 2018-09-17 RX ADMIN — PAROXETINE HYDROCHLORIDE 20 MG: 20 TABLET, FILM COATED ORAL at 09:15

## 2018-09-17 RX ADMIN — SODIUM CHLORIDE: 9 INJECTION, SOLUTION INTRAVENOUS at 02:28

## 2018-09-17 RX ADMIN — CIPROFLOXACIN 400 MG: 2 INJECTION, SOLUTION INTRAVENOUS at 10:28

## 2018-09-17 RX ADMIN — CIPROFLOXACIN 400 MG: 2 INJECTION, SOLUTION INTRAVENOUS at 01:06

## 2018-09-17 RX ADMIN — ENOXAPARIN SODIUM 40 MG: 40 INJECTION SUBCUTANEOUS at 09:16

## 2018-09-17 ASSESSMENT — PAIN SCALES - GENERAL
PAINLEVEL_OUTOF10: 0
PAINLEVEL_OUTOF10: 0

## 2018-09-17 NOTE — PROGRESS NOTES
Writer attempted to contact patients children at this time to see if any family would be available to sit with patient for patient safety. Patient still anxious and pulling off blankets and gown along with IV tubeing but hasn't attempted to exit bed unassisted as of recent. Was not able to get in contact with children. Will continue to monitor.

## 2018-09-17 NOTE — DISCHARGE SUMMARY
acute abnormality. SINUSES: The visualized paranasal sinuses and mastoid air cells demonstrate no acute abnormality. SOFT TISSUES/SKULL:  Hyperostosis of the skull is noted. Nonspecific high density in the basilar terminus. This may be artifact or calcification. A small amount of thrombus would be difficult to exclude given the patient's history of clinical suspicion for stroke. CT angiogram may be helpful No acute intracranial abnormality otherwise noted Critical results were called by Dr. Zack Lang to Surekha Cool on 9/14/2018 at 19:30. Xr Chest 1 Vw    Result Date: 9/14/2018  EXAMINATION: SINGLE XRAY VIEW OF THE CHEST 9/14/2018 6:40 pm COMPARISON: None. HISTORY: ORDERING SYSTEM PROVIDED HISTORY: unresponsive TECHNOLOGIST PROVIDED HISTORY: unresponsive FINDINGS: No pneumothorax. No focal consolidation. Mild elevation of the right hemidiaphragm. No cardiomegaly. No pulmonary edema. Atherosclerotic calcification of the thoracic aorta. Advanced bilateral shoulder degenerative changes. No acute findings. Discharge Diagnoses:    Principal Problem:    Altered mental status  Active Problems:    UTI (urinary tract infection)    Dehydration    Alzheimer's dementia    Prerenal azotemia    Metabolic encephalopathy  Resolved Problems:    * No resolved hospital problems.  *      Active Hospital Problems    Diagnosis Date Noted    Metabolic encephalopathy [Y58.54] 09/17/2018    Altered mental status [R41.82] 09/14/2018    UTI (urinary tract infection) [N39.0] 09/14/2018    Dehydration [E86.0] 09/14/2018    Alzheimer's dementia [G30.9, F02.80] 09/14/2018    Prerenal azotemia [R79.89] 09/14/2018       Discharge Medications:       Beto Hudosn   Home Medication Instructions [facility]:[id number]    Printed on:09/17/18 150   Medication Information                      ciprofloxacin (CIPRO) 500 MG tablet  Take 1 tablet by mouth 2 times daily for 7 days             Cyanocobalamin (VITAMIN B-12) 1000

## 2018-09-17 NOTE — PLAN OF CARE
Problem: Falls - Risk of:  Goal: Will remain free from falls  Will remain free from falls   Outcome: Ongoing  Patient alert but oriented to self only. Patient has been attempting to exit bed without assistance and now has telesitter in place. Falling star program in use with fall band in place. Non skid socks are worn by patient. Call light, and bed side table along with personal belongings are within reach of patient. Will continue to monitor. Goal: Absence of physical injury  Absence of physical injury   Outcome: Ongoing  Patient alert but oriented to self only. Patient has been attempting to exit bed without assistance and now has telesitter in place. Falling star program in use with fall band in place. Non skid socks are worn by patient. Call light, and bed side table along with personal belongings are within reach of patient. Will continue to monitor. Problem: Risk for Impaired Skin Integrity  Goal: Tissue integrity - skin and mucous membranes  Structural intactness and normal physiological function of skin and  mucous membranes. Outcome: Ongoing  Patient requires assistance with turning and repositioning and needs a lot of encouragement to do so. Skin kept clean and dry and is intact at this time, redness noted to coccyx. Monitoring for skin changes every shift and as needed. No open areas on skin or in mucous membranes noted at this time, will continue to monitor.

## 2018-09-17 NOTE — PROGRESS NOTES
New orders per Dr. Jamar Mendez at this time to give ativan 0.5 mg every 6 hours IV as needed for agitation and anxiety. Will continue to monitor.

## 2018-09-17 NOTE — PLAN OF CARE
Problem: Nutrition  Goal: Optimal nutrition therapy  Outcome: Ongoing  Nutrition Problem:  (self feeding difficulty)  Intervention: Food and/or Nutrient Delivery: Continue current diet  Nutritional Goals: PO > 75% of meals

## 2018-09-17 NOTE — PROGRESS NOTES
Nutrition Assessment    Type and Reason for Visit: Initial    Nutrition Recommendations:   Continue current diet. Malnutrition Assessment:  · Malnutrition Status: At risk for malnutrition  · Context: Acute illness or injury  · Findings of the 6 clinical characteristics of malnutrition (Minimum of 2 out of 6 clinical characteristics is required to make the diagnosis of moderate or severe Protein Calorie Malnutrition based on AND/ASPEN Guidelines):  1. Energy Intake-Not available,      2. Weight Loss-Unable to assess,    3. Fat Loss-Mild subcutaneous fat loss, Orbital  4. Muscle Loss-Mild muscle mass loss, Temples (temporalis muscle)  5. Fluid Accumulation-No significant fluid accumulation,    6.  Strength-Not measured    Nutrition Diagnosis:   · Problem:  (self feeding difficulty)  · Etiology: related to Cognitive or neurological impairment     Signs and symptoms:  as evidenced by Other (dependence on staff and family for meals)    Nutrition Assessment:  · Subjective Assessment: None. Pt asleep. · Nutrition-Focused Physical Findings: Facial features appear thin. Teeth missing.    · Wound Type: None  · Current Nutrition Therapies:  · Oral Diet Orders: General   · Oral Diet intake: %, Assist  · Anthropometric Measures:  · Ht: 5' 7\" (170.2 cm)   · Current Body Wt: 145 lb 8 oz (66 kg)  · Admission Body Wt: 132 lb 14.4 oz (60.3 kg)  · Usual Body Wt:  (unknown)  ·  BMI Classification: BMI 18.5 - 24.9 Normal Weight  ·   Wt Readings from Last 3 Encounters:   09/16/18 145 lb 4.8 oz (65.9 kg)     Recent Labs      09/14/18   1630  09/15/18   0602  09/16/18   0558  09/17/18   0535   NA  148*  144  140  141   K  4.1  3.8  3.8  3.6*   CL  109*  109*  105  109*   CO2  32*  27  30  26   BUN  30*  22  16  11   CREATININE  1.08*  0.74  0.90  0.68   GLUCOSE  86  90  92  97   ALT  7   --    --    --    ALKPHOS  82   --    --    --    GFR                                                      Lab Results   Component

## 2018-09-17 NOTE — PROGRESS NOTES
Called report to Johana rogers RN at [facility]. All questions answered at this time.   Writer provided phone number in case of additional questions

## 2018-09-17 NOTE — PROGRESS NOTES
Status 09/14/2018  8:49  [address]   FINAL 09/16/2018      Culture & Susceptibility   KLEBSIELLA OXYTOCA     Antibiotic Interpretation HIRA Status   Confirmatory Extended Spectrum Beta-Lactamase Sensitive NEGATIVE Final   amikacin  NOT REPORTED Final   ampicillin Resistant >=32 RESISTANT Final   ampicillin-sulbactam  NOT REPORTED Final   aztreonam Sensitive <=1 SUSCEPTIBLE Final   ceFAZolin  NOT REPORTED Final   cefTRIAXone Sensitive <=1 SUSCEPTIBLE Final   cefepime  NOT REPORTED Final   ciprofloxacin Sensitive <=0.25 SUSCEPTIBLE Final   ertapenem  NOT REPORTED Final   gentamicin Sensitive <=1 SUSCEPTIBLE Final   meropenem  NOT REPORTED Final   nitrofurantoin Sensitive <=16 SUSCEPTIBLE Final   piperacillin-tazobactam Sensitive <=4 SUSCEPTIBLE Final   tigecycline  NOT REPORTED Final   tobramycin Sensitive <=1 SUSCEPTIBLE Final   trimethoprim-sulfamethoxazole Sensitive <=20 SUSCEPTIBLE Final            PROBLEM LIST:  Principal Problem:    Altered mental status  Active Problems:    UTI (urinary tract infection)    Dehydration    Prerenal azotemia    Alzheimer's dementia  Resolved Problems:    * No resolved hospital problems. *      ASSESSMENT / PLAN:  · Altered mental status / metabolic encephalopathy  ? Improving  · Klebsiella oxytoca UTI  ? Sensitive to cipro  · Dehydration  ? Improved with IVF hydration  · Prerenal azotemia  ? renal function improved with hydration  · Alzheimer's dementia  ? Continue home psych meds  · Nutrition status: mild to moderate protein calorie malnutrition  · DVT prophylaxis: Lovenox   · Disposition:  Pt's  requests ECF placement at [facility] (daughter works there).   Will get SS consult    Wade Teresa M.D.  9/17/2018  7:21 AM

## 2018-09-18 PROBLEM — F02.80 ALZHEIMER'S DEMENTIA (HCC): Chronic | Status: RESOLVED | Noted: 2018-09-14 | Resolved: 2018-09-18

## 2018-09-18 PROBLEM — G30.0 EARLY ONSET ALZHEIMER'S DEMENTIA WITHOUT BEHAVIORAL DISTURBANCE (HCC): Chronic | Status: ACTIVE | Noted: 2018-09-18

## 2018-09-18 PROBLEM — G30.9 ALZHEIMER'S DEMENTIA (HCC): Chronic | Status: RESOLVED | Noted: 2018-09-14 | Resolved: 2018-09-18

## 2018-09-18 PROBLEM — F02.80 EARLY ONSET ALZHEIMER'S DEMENTIA WITHOUT BEHAVIORAL DISTURBANCE (HCC): Chronic | Status: ACTIVE | Noted: 2018-09-18

## 2018-09-19 LAB
CULTURE: NORMAL
Lab: NORMAL
SPECIMEN DESCRIPTION: NORMAL
STATUS: NORMAL

## 2018-10-01 PROBLEM — R41.82 ALTERED MENTAL STATUS: Status: RESOLVED | Noted: 2018-09-14 | Resolved: 2018-10-01

## 2018-10-01 PROBLEM — E86.0 DEHYDRATION: Status: RESOLVED | Noted: 2018-09-14 | Resolved: 2018-10-01

## 2018-10-01 PROBLEM — N39.0 UTI (URINARY TRACT INFECTION): Status: RESOLVED | Noted: 2018-09-14 | Resolved: 2018-10-01

## 2018-10-01 PROBLEM — R53.81 DEBILITY: Status: ACTIVE | Noted: 2018-10-01

## 2018-10-01 PROBLEM — R79.89 PRERENAL AZOTEMIA: Status: RESOLVED | Noted: 2018-09-14 | Resolved: 2018-10-01

## 2018-10-04 PROBLEM — G30.0 EARLY ONSET ALZHEIMER'S DEMENTIA WITHOUT BEHAVIORAL DISTURBANCE (HCC): Chronic | Status: RESOLVED | Noted: 2018-09-18 | Resolved: 2018-10-04

## 2018-10-04 PROBLEM — G93.41 METABOLIC ENCEPHALOPATHY: Status: RESOLVED | Noted: 2018-09-17 | Resolved: 2018-10-04

## 2018-10-04 PROBLEM — F02.818 DEMENTIA OF THE ALZHEIMER'S TYPE WITH EARLY ONSET WITH BEHAVIORAL DISTURBANCE (HCC): Status: ACTIVE | Noted: 2018-10-04

## 2018-10-04 PROBLEM — G30.0 DEMENTIA OF THE ALZHEIMER'S TYPE WITH EARLY ONSET WITH BEHAVIORAL DISTURBANCE (HCC): Status: ACTIVE | Noted: 2018-10-04

## 2018-10-04 PROBLEM — F02.80 EARLY ONSET ALZHEIMER'S DEMENTIA WITHOUT BEHAVIORAL DISTURBANCE (HCC): Chronic | Status: RESOLVED | Noted: 2018-09-18 | Resolved: 2018-10-04

## 2018-10-11 ENCOUNTER — HOSPITAL ENCOUNTER (OUTPATIENT)
Age: 78
Setting detail: SPECIMEN
Discharge: HOME | End: 2018-10-11
Payer: MEDICARE

## 2018-10-11 LAB
-: ABNORMAL
AMORPHOUS: ABNORMAL
BACTERIA: ABNORMAL
BILIRUBIN URINE: NEGATIVE
CASTS UA: ABNORMAL /LPF
COLOR: YELLOW
COMMENT UA: ABNORMAL
CRYSTALS, UA: ABNORMAL /HPF
EPITHELIAL CELLS UA: ABNORMAL /HPF (ref 0–25)
GLUCOSE URINE: NEGATIVE
KETONES, URINE: NEGATIVE
LEUKOCYTE ESTERASE, URINE: NEGATIVE
MUCUS: ABNORMAL
NITRITE, URINE: NEGATIVE
OTHER OBSERVATIONS UA: ABNORMAL
PH UA: 6 (ref 5–9)
PROTEIN UA: NEGATIVE
RBC UA: ABNORMAL /HPF (ref 0–2)
RENAL EPITHELIAL, UA: ABNORMAL /HPF
SPECIFIC GRAVITY UA: 1.02 (ref 1.01–1.02)
TRICHOMONAS: ABNORMAL
TURBIDITY: CLEAR
URINE HGB: NEGATIVE
UROBILINOGEN, URINE: NORMAL
WBC UA: ABNORMAL /HPF (ref 0–5)
YEAST: ABNORMAL

## 2018-10-11 PROCEDURE — 81001 URINALYSIS AUTO W/SCOPE: CPT

## 2018-11-12 ENCOUNTER — APPOINTMENT (OUTPATIENT)
Dept: CT IMAGING | Age: 78
End: 2018-11-12
Payer: MEDICARE

## 2018-11-12 ENCOUNTER — HOSPITAL ENCOUNTER (OUTPATIENT)
Age: 78
Setting detail: OBSERVATION
Discharge: NURSING, REHAB OR LONG-TERM CARE | End: 2018-11-14
Attending: FAMILY MEDICINE | Admitting: FAMILY MEDICINE
Payer: MEDICARE

## 2018-11-12 ENCOUNTER — APPOINTMENT (OUTPATIENT)
Dept: GENERAL RADIOLOGY | Age: 78
End: 2018-11-12
Payer: MEDICARE

## 2018-11-12 ENCOUNTER — APPOINTMENT (OUTPATIENT)
Dept: NON INVASIVE DIAGNOSTICS | Age: 78
End: 2018-11-12
Payer: MEDICARE

## 2018-11-12 DIAGNOSIS — N39.0 ACUTE UTI: ICD-10-CM

## 2018-11-12 DIAGNOSIS — R55 SYNCOPE, UNSPECIFIED SYNCOPE TYPE: Primary | ICD-10-CM

## 2018-11-12 LAB
-: NORMAL
ABSOLUTE EOS #: 0.27 K/UL (ref 0–0.44)
ABSOLUTE IMMATURE GRANULOCYTE: 0.05 K/UL (ref 0–0.3)
ABSOLUTE LYMPH #: 1.1 K/UL (ref 1.1–3.7)
ABSOLUTE MONO #: 0.48 K/UL (ref 0.1–1.2)
ALBUMIN SERPL-MCNC: 3.8 G/DL (ref 3.5–5.2)
ALBUMIN/GLOBULIN RATIO: 1.4 (ref 1–2.5)
ALP BLD-CCNC: 93 U/L (ref 35–104)
ALT SERPL-CCNC: 10 U/L (ref 5–33)
AMORPHOUS: NORMAL
ANION GAP SERPL CALCULATED.3IONS-SCNC: 9 MMOL/L (ref 9–17)
AST SERPL-CCNC: 15 U/L
BACTERIA: NORMAL
BASOPHILS # BLD: 1 % (ref 0–2)
BASOPHILS ABSOLUTE: 0.06 K/UL (ref 0–0.2)
BILIRUB SERPL-MCNC: 0.24 MG/DL (ref 0.3–1.2)
BILIRUBIN URINE: NEGATIVE
BNP INTERPRETATION: NORMAL
BUN BLDV-MCNC: 22 MG/DL (ref 8–23)
BUN/CREAT BLD: 27 (ref 9–20)
CALCIUM SERPL-MCNC: 8.9 MG/DL (ref 8.6–10.4)
CASTS UA: NORMAL /LPF
CHLORIDE BLD-SCNC: 102 MMOL/L (ref 98–107)
CO2: 31 MMOL/L (ref 20–31)
COLOR: YELLOW
COMMENT UA: ABNORMAL
CREAT SERPL-MCNC: 0.82 MG/DL (ref 0.5–0.9)
CRYSTALS, UA: NORMAL /HPF
DIFFERENTIAL TYPE: ABNORMAL
EOSINOPHILS RELATIVE PERCENT: 2 % (ref 1–4)
EPITHELIAL CELLS UA: NORMAL /HPF (ref 0–25)
GFR AFRICAN AMERICAN: >60 ML/MIN
GFR NON-AFRICAN AMERICAN: >60 ML/MIN
GFR SERPL CREATININE-BSD FRML MDRD: ABNORMAL ML/MIN/{1.73_M2}
GFR SERPL CREATININE-BSD FRML MDRD: ABNORMAL ML/MIN/{1.73_M2}
GLUCOSE BLD-MCNC: 95 MG/DL (ref 70–99)
GLUCOSE URINE: NEGATIVE
HCT VFR BLD CALC: 40.7 % (ref 36.3–47.1)
HEMOGLOBIN: 13 G/DL (ref 11.9–15.1)
IMMATURE GRANULOCYTES: 0 %
KETONES, URINE: NEGATIVE
LACTIC ACID, WHOLE BLOOD: NORMAL MMOL/L (ref 0.7–2.1)
LACTIC ACID: 1.3 MMOL/L (ref 0.5–2.2)
LEUKOCYTE ESTERASE, URINE: ABNORMAL
LV EF: 60 %
LVEF MODALITY: NORMAL
LYMPHOCYTES # BLD: 10 % (ref 24–43)
MCH RBC QN AUTO: 31 PG (ref 25.2–33.5)
MCHC RBC AUTO-ENTMCNC: 31.9 G/DL (ref 28.4–34.8)
MCV RBC AUTO: 96.9 FL (ref 82.6–102.9)
MONOCYTES # BLD: 4 % (ref 3–12)
MUCUS: NORMAL
NITRITE, URINE: POSITIVE
NRBC AUTOMATED: 0 PER 100 WBC
OTHER OBSERVATIONS UA: NORMAL
PDW BLD-RTO: 13.3 % (ref 11.8–14.4)
PH UA: 7 (ref 5–9)
PLATELET # BLD: 268 K/UL (ref 138–453)
PLATELET ESTIMATE: ABNORMAL
PMV BLD AUTO: 11.3 FL (ref 8.1–13.5)
POTASSIUM SERPL-SCNC: 4.2 MMOL/L (ref 3.7–5.3)
PRO-BNP: 170 PG/ML
PROTEIN UA: NEGATIVE
RBC # BLD: 4.2 M/UL (ref 3.95–5.11)
RBC # BLD: ABNORMAL 10*6/UL
RBC UA: NORMAL /HPF (ref 0–2)
RENAL EPITHELIAL, UA: NORMAL /HPF
SEG NEUTROPHILS: 83 % (ref 36–65)
SEGMENTED NEUTROPHILS ABSOLUTE COUNT: 9.64 K/UL (ref 1.5–8.1)
SODIUM BLD-SCNC: 142 MMOL/L (ref 135–144)
SPECIFIC GRAVITY UA: 1.01 (ref 1.01–1.02)
TOTAL PROTEIN: 6.6 G/DL (ref 6.4–8.3)
TRICHOMONAS: NORMAL
TROPONIN INTERP: NORMAL
TROPONIN T: <0.03 NG/ML
TURBIDITY: CLEAR
URINE HGB: ABNORMAL
UROBILINOGEN, URINE: NORMAL
WBC # BLD: 11.6 K/UL (ref 3.5–11.3)
WBC # BLD: ABNORMAL 10*3/UL
WBC UA: NORMAL /HPF (ref 0–5)
YEAST: NORMAL

## 2018-11-12 PROCEDURE — 99285 EMERGENCY DEPT VISIT HI MDM: CPT

## 2018-11-12 PROCEDURE — 83605 ASSAY OF LACTIC ACID: CPT

## 2018-11-12 PROCEDURE — 36415 COLL VENOUS BLD VENIPUNCTURE: CPT

## 2018-11-12 PROCEDURE — 81001 URINALYSIS AUTO W/SCOPE: CPT

## 2018-11-12 PROCEDURE — 93005 ELECTROCARDIOGRAM TRACING: CPT

## 2018-11-12 PROCEDURE — 80053 COMPREHEN METABOLIC PANEL: CPT

## 2018-11-12 PROCEDURE — 2580000003 HC RX 258: Performed by: PHYSICIAN ASSISTANT

## 2018-11-12 PROCEDURE — 96372 THER/PROPH/DIAG INJ SC/IM: CPT

## 2018-11-12 PROCEDURE — 96366 THER/PROPH/DIAG IV INF ADDON: CPT

## 2018-11-12 PROCEDURE — 6360000002 HC RX W HCPCS: Performed by: PHYSICIAN ASSISTANT

## 2018-11-12 PROCEDURE — 83880 ASSAY OF NATRIURETIC PEPTIDE: CPT

## 2018-11-12 PROCEDURE — 85025 COMPLETE CBC W/AUTO DIFF WBC: CPT

## 2018-11-12 PROCEDURE — 84484 ASSAY OF TROPONIN QUANT: CPT

## 2018-11-12 PROCEDURE — 86403 PARTICLE AGGLUT ANTBDY SCRN: CPT

## 2018-11-12 PROCEDURE — 87086 URINE CULTURE/COLONY COUNT: CPT

## 2018-11-12 PROCEDURE — 6370000000 HC RX 637 (ALT 250 FOR IP): Performed by: FAMILY MEDICINE

## 2018-11-12 PROCEDURE — 1200000000 HC SEMI PRIVATE

## 2018-11-12 PROCEDURE — 70450 CT HEAD/BRAIN W/O DYE: CPT

## 2018-11-12 PROCEDURE — 71046 X-RAY EXAM CHEST 2 VIEWS: CPT

## 2018-11-12 PROCEDURE — 96365 THER/PROPH/DIAG IV INF INIT: CPT

## 2018-11-12 PROCEDURE — 93306 TTE W/DOPPLER COMPLETE: CPT

## 2018-11-12 PROCEDURE — 6370000000 HC RX 637 (ALT 250 FOR IP): Performed by: PHYSICIAN ASSISTANT

## 2018-11-12 PROCEDURE — 87040 BLOOD CULTURE FOR BACTERIA: CPT

## 2018-11-12 PROCEDURE — 87186 SC STD MICRODIL/AGAR DIL: CPT

## 2018-11-12 RX ORDER — DONEPEZIL HYDROCHLORIDE 5 MG/1
10 TABLET, FILM COATED ORAL DAILY
Status: DISCONTINUED | OUTPATIENT
Start: 2018-11-13 | End: 2018-11-14 | Stop reason: HOSPADM

## 2018-11-12 RX ORDER — LORAZEPAM 2 MG/ML
1 INJECTION INTRAMUSCULAR EVERY 6 HOURS PRN
Status: DISCONTINUED | OUTPATIENT
Start: 2018-11-12 | End: 2018-11-14 | Stop reason: HOSPADM

## 2018-11-12 RX ORDER — PAROXETINE HYDROCHLORIDE 20 MG/1
20 TABLET, FILM COATED ORAL EVERY MORNING
Status: DISCONTINUED | OUTPATIENT
Start: 2018-11-13 | End: 2018-11-14 | Stop reason: HOSPADM

## 2018-11-12 RX ORDER — ONDANSETRON 2 MG/ML
4 INJECTION INTRAMUSCULAR; INTRAVENOUS EVERY 6 HOURS PRN
Status: DISCONTINUED | OUTPATIENT
Start: 2018-11-12 | End: 2018-11-14 | Stop reason: HOSPADM

## 2018-11-12 RX ORDER — FAMOTIDINE 20 MG/1
20 TABLET, FILM COATED ORAL 2 TIMES DAILY
Status: DISCONTINUED | OUTPATIENT
Start: 2018-11-12 | End: 2018-11-14 | Stop reason: HOSPADM

## 2018-11-12 RX ORDER — SODIUM CHLORIDE 0.9 % (FLUSH) 0.9 %
10 SYRINGE (ML) INJECTION EVERY 12 HOURS SCHEDULED
Status: DISCONTINUED | OUTPATIENT
Start: 2018-11-12 | End: 2018-11-14 | Stop reason: HOSPADM

## 2018-11-12 RX ORDER — MEMANTINE HYDROCHLORIDE 10 MG/1
10 TABLET ORAL 2 TIMES DAILY
Status: DISCONTINUED | OUTPATIENT
Start: 2018-11-13 | End: 2018-11-14 | Stop reason: HOSPADM

## 2018-11-12 RX ORDER — BISACODYL 10 MG
10 SUPPOSITORY, RECTAL RECTAL DAILY PRN
COMMUNITY

## 2018-11-12 RX ORDER — SODIUM PHOSPHATE, DIBASIC AND SODIUM PHOSPHATE, MONOBASIC 7; 19 G/133ML; G/133ML
1 ENEMA RECTAL
COMMUNITY

## 2018-11-12 RX ORDER — CIPROFLOXACIN 2 MG/ML
400 INJECTION, SOLUTION INTRAVENOUS ONCE
Status: COMPLETED | OUTPATIENT
Start: 2018-11-12 | End: 2018-11-12

## 2018-11-12 RX ORDER — QUETIAPINE FUMARATE 25 MG/1
25 TABLET, FILM COATED ORAL 2 TIMES DAILY
Status: DISCONTINUED | OUTPATIENT
Start: 2018-11-12 | End: 2018-11-14 | Stop reason: HOSPADM

## 2018-11-12 RX ORDER — ACETAMINOPHEN 325 MG/1
650 TABLET ORAL EVERY 4 HOURS PRN
COMMUNITY

## 2018-11-12 RX ORDER — ZIPRASIDONE MESYLATE 20 MG/ML
20 INJECTION, POWDER, LYOPHILIZED, FOR SOLUTION INTRAMUSCULAR ONCE
Status: COMPLETED | OUTPATIENT
Start: 2018-11-12 | End: 2018-11-12

## 2018-11-12 RX ORDER — DOCUSATE SODIUM 100 MG/1
100 CAPSULE, LIQUID FILLED ORAL 2 TIMES DAILY
Status: DISCONTINUED | OUTPATIENT
Start: 2018-11-12 | End: 2018-11-14 | Stop reason: HOSPADM

## 2018-11-12 RX ORDER — CIPROFLOXACIN 2 MG/ML
400 INJECTION, SOLUTION INTRAVENOUS EVERY 12 HOURS
Status: DISCONTINUED | OUTPATIENT
Start: 2018-11-13 | End: 2018-11-14 | Stop reason: HOSPADM

## 2018-11-12 RX ORDER — DOCUSATE SODIUM 100 MG/1
100 CAPSULE, LIQUID FILLED ORAL DAILY
COMMUNITY

## 2018-11-12 RX ORDER — BISACODYL 10 MG
10 SUPPOSITORY, RECTAL RECTAL DAILY PRN
Status: DISCONTINUED | OUTPATIENT
Start: 2018-11-12 | End: 2018-11-14 | Stop reason: HOSPADM

## 2018-11-12 RX ORDER — SODIUM CHLORIDE 9 MG/ML
INJECTION, SOLUTION INTRAVENOUS CONTINUOUS
Status: DISCONTINUED | OUTPATIENT
Start: 2018-11-12 | End: 2018-11-14 | Stop reason: HOSPADM

## 2018-11-12 RX ORDER — MEMANTINE HYDROCHLORIDE 28 MG/1
28 CAPSULE, EXTENDED RELEASE ORAL DAILY
COMMUNITY

## 2018-11-12 RX ORDER — LANOLIN ALCOHOL/MO/W.PET/CERES
1000 CREAM (GRAM) TOPICAL DAILY
Status: DISCONTINUED | OUTPATIENT
Start: 2018-11-13 | End: 2018-11-14 | Stop reason: HOSPADM

## 2018-11-12 RX ORDER — 0.9 % SODIUM CHLORIDE 0.9 %
1000 INTRAVENOUS SOLUTION INTRAVENOUS ONCE
Status: COMPLETED | OUTPATIENT
Start: 2018-11-12 | End: 2018-11-12

## 2018-11-12 RX ORDER — SODIUM CHLORIDE 0.9 % (FLUSH) 0.9 %
10 SYRINGE (ML) INJECTION PRN
Status: DISCONTINUED | OUTPATIENT
Start: 2018-11-12 | End: 2018-11-14 | Stop reason: HOSPADM

## 2018-11-12 RX ORDER — ACETAMINOPHEN 325 MG/1
650 TABLET ORAL EVERY 4 HOURS PRN
Status: DISCONTINUED | OUTPATIENT
Start: 2018-11-12 | End: 2018-11-14 | Stop reason: HOSPADM

## 2018-11-12 RX ADMIN — SODIUM CHLORIDE: 9 INJECTION, SOLUTION INTRAVENOUS at 15:45

## 2018-11-12 RX ADMIN — ZIPRASIDONE MESYLATE 20 MG: 20 INJECTION, POWDER, LYOPHILIZED, FOR SOLUTION INTRAMUSCULAR at 12:47

## 2018-11-12 RX ADMIN — SODIUM CHLORIDE 1000 ML: 9 INJECTION, SOLUTION INTRAVENOUS at 14:51

## 2018-11-12 RX ADMIN — ENOXAPARIN SODIUM 40 MG: 40 INJECTION SUBCUTANEOUS at 16:51

## 2018-11-12 RX ADMIN — CIPROFLOXACIN 400 MG: 2 INJECTION, SOLUTION INTRAVENOUS at 16:53

## 2018-11-12 RX ADMIN — QUETIAPINE FUMARATE 25 MG: 25 TABLET ORAL at 21:52

## 2018-11-12 RX ADMIN — FAMOTIDINE 20 MG: 20 TABLET ORAL at 21:52

## 2018-11-12 RX ADMIN — DOCUSATE SODIUM 100 MG: 100 CAPSULE, LIQUID FILLED ORAL at 21:52

## 2018-11-12 ASSESSMENT — ENCOUNTER SYMPTOMS
VOMITING: 1
EYE DISCHARGE: 0
BACK PAIN: 0
DIARRHEA: 0
COUGH: 0
BLOOD IN STOOL: 0
CONSTIPATION: 0
CHEST TIGHTNESS: 0
SORE THROAT: 0
NAUSEA: 1
SHORTNESS OF BREATH: 0
RHINORRHEA: 0
WHEEZING: 0
EYE REDNESS: 0
ABDOMINAL PAIN: 0

## 2018-11-12 NOTE — ED NOTES
IM geodon given to the patient in the RUE at this time. It was explained to the daughter that this was preferred over wrist restraints at this time.       Hasrh Sheehan RN  11/12/18 1797

## 2018-11-12 NOTE — ED NOTES
Nursing supervisor in room attempting to obtain IV access     Vane Hankins, STEPHANIE  11/12/18 6027

## 2018-11-13 PROBLEM — N39.0 URINARY TRACT INFECTION: Status: ACTIVE | Noted: 2018-11-13

## 2018-11-13 LAB
ALBUMIN SERPL-MCNC: 3.5 G/DL (ref 3.5–5.2)
ALBUMIN/GLOBULIN RATIO: 1.3 (ref 1–2.5)
ALP BLD-CCNC: 86 U/L (ref 35–104)
ALT SERPL-CCNC: 9 U/L (ref 5–33)
ANION GAP SERPL CALCULATED.3IONS-SCNC: 7 MMOL/L (ref 9–17)
AST SERPL-CCNC: 13 U/L
BILIRUB SERPL-MCNC: 0.45 MG/DL (ref 0.3–1.2)
BUN BLDV-MCNC: 15 MG/DL (ref 8–23)
BUN/CREAT BLD: 19 (ref 9–20)
CALCIUM SERPL-MCNC: 9.2 MG/DL (ref 8.6–10.4)
CHLORIDE BLD-SCNC: 103 MMOL/L (ref 98–107)
CO2: 29 MMOL/L (ref 20–31)
CREAT SERPL-MCNC: 0.79 MG/DL (ref 0.5–0.9)
EKG ATRIAL RATE: 60 BPM
EKG P AXIS: 63 DEGREES
EKG P-R INTERVAL: 170 MS
EKG Q-T INTERVAL: 428 MS
EKG QRS DURATION: 84 MS
EKG QTC CALCULATION (BAZETT): 428 MS
EKG R AXIS: 41 DEGREES
EKG T AXIS: 59 DEGREES
EKG VENTRICULAR RATE: 60 BPM
GFR AFRICAN AMERICAN: >60 ML/MIN
GFR NON-AFRICAN AMERICAN: >60 ML/MIN
GFR SERPL CREATININE-BSD FRML MDRD: ABNORMAL ML/MIN/{1.73_M2}
GFR SERPL CREATININE-BSD FRML MDRD: ABNORMAL ML/MIN/{1.73_M2}
GLUCOSE BLD-MCNC: 92 MG/DL (ref 70–99)
HCT VFR BLD CALC: 41.1 % (ref 36.3–47.1)
HEMOGLOBIN: 12.8 G/DL (ref 11.9–15.1)
MCH RBC QN AUTO: 30.3 PG (ref 25.2–33.5)
MCHC RBC AUTO-ENTMCNC: 31.1 G/DL (ref 28.4–34.8)
MCV RBC AUTO: 97.4 FL (ref 82.6–102.9)
NRBC AUTOMATED: 0 PER 100 WBC
PDW BLD-RTO: 13.3 % (ref 11.8–14.4)
PLATELET # BLD: 247 K/UL (ref 138–453)
PMV BLD AUTO: 10.9 FL (ref 8.1–13.5)
POTASSIUM SERPL-SCNC: 3.9 MMOL/L (ref 3.7–5.3)
RBC # BLD: 4.22 M/UL (ref 3.95–5.11)
SODIUM BLD-SCNC: 139 MMOL/L (ref 135–144)
TOTAL PROTEIN: 6.3 G/DL (ref 6.4–8.3)
WBC # BLD: 9.5 K/UL (ref 3.5–11.3)

## 2018-11-13 PROCEDURE — 96372 THER/PROPH/DIAG INJ SC/IM: CPT

## 2018-11-13 PROCEDURE — 96366 THER/PROPH/DIAG IV INF ADDON: CPT

## 2018-11-13 PROCEDURE — G0378 HOSPITAL OBSERVATION PER HR: HCPCS

## 2018-11-13 PROCEDURE — 6360000002 HC RX W HCPCS: Performed by: PHYSICIAN ASSISTANT

## 2018-11-13 PROCEDURE — 6370000000 HC RX 637 (ALT 250 FOR IP): Performed by: FAMILY MEDICINE

## 2018-11-13 PROCEDURE — 2580000003 HC RX 258: Performed by: PHYSICIAN ASSISTANT

## 2018-11-13 PROCEDURE — 6370000000 HC RX 637 (ALT 250 FOR IP): Performed by: PHYSICIAN ASSISTANT

## 2018-11-13 PROCEDURE — 85027 COMPLETE CBC AUTOMATED: CPT

## 2018-11-13 PROCEDURE — 80053 COMPREHEN METABOLIC PANEL: CPT

## 2018-11-13 PROCEDURE — 36415 COLL VENOUS BLD VENIPUNCTURE: CPT

## 2018-11-13 PROCEDURE — 6360000002 HC RX W HCPCS: Performed by: INTERNAL MEDICINE

## 2018-11-13 PROCEDURE — 2580000003 HC RX 258: Performed by: INTERNAL MEDICINE

## 2018-11-13 PROCEDURE — 96367 TX/PROPH/DG ADDL SEQ IV INF: CPT

## 2018-11-13 RX ADMIN — CIPROFLOXACIN 400 MG: 2 INJECTION, SOLUTION INTRAVENOUS at 02:08

## 2018-11-13 RX ADMIN — CIPROFLOXACIN 400 MG: 2 INJECTION, SOLUTION INTRAVENOUS at 13:53

## 2018-11-13 RX ADMIN — SODIUM CHLORIDE: 9 INJECTION, SOLUTION INTRAVENOUS at 19:19

## 2018-11-13 RX ADMIN — CYANOCOBALAMIN TAB 1000 MCG 1000 MCG: 1000 TAB at 10:29

## 2018-11-13 RX ADMIN — QUETIAPINE FUMARATE 25 MG: 25 TABLET ORAL at 10:29

## 2018-11-13 RX ADMIN — FAMOTIDINE 20 MG: 20 TABLET ORAL at 10:29

## 2018-11-13 RX ADMIN — QUETIAPINE FUMARATE 25 MG: 25 TABLET ORAL at 20:09

## 2018-11-13 RX ADMIN — MEMANTINE HYDROCHLORIDE 10 MG: 10 TABLET, FILM COATED ORAL at 20:08

## 2018-11-13 RX ADMIN — DOCUSATE SODIUM 100 MG: 100 CAPSULE, LIQUID FILLED ORAL at 20:08

## 2018-11-13 RX ADMIN — FAMOTIDINE 20 MG: 20 TABLET ORAL at 20:08

## 2018-11-13 RX ADMIN — ENOXAPARIN SODIUM 40 MG: 40 INJECTION SUBCUTANEOUS at 10:30

## 2018-11-13 RX ADMIN — SODIUM CHLORIDE: 9 INJECTION, SOLUTION INTRAVENOUS at 05:28

## 2018-11-13 RX ADMIN — VANCOMYCIN HYDROCHLORIDE 1000 MG: 1 INJECTION, POWDER, LYOPHILIZED, FOR SOLUTION INTRAVENOUS at 22:37

## 2018-11-13 RX ADMIN — DONEPEZIL HYDROCHLORIDE 10 MG: 5 TABLET, FILM COATED ORAL at 10:29

## 2018-11-13 RX ADMIN — MEMANTINE HYDROCHLORIDE 10 MG: 10 TABLET, FILM COATED ORAL at 10:36

## 2018-11-13 RX ADMIN — DOCUSATE SODIUM 100 MG: 100 CAPSULE, LIQUID FILLED ORAL at 10:36

## 2018-11-13 RX ADMIN — PAROXETINE HYDROCHLORIDE 20 MG: 20 TABLET, FILM COATED ORAL at 10:29

## 2018-11-13 NOTE — CARE COORDINATION
Spoke with Pts  (DPOA) about the change in status from Inpatient to Observation. Written and verbal info given in regards to Medicare IP vs OBS. Pt is a long term resident at [facility]- discussed those implications in regards to the change in status. JULIO CÉSAR signed by .

## 2018-11-13 NOTE — PLAN OF CARE
Problem: Nutrition  Goal: Optimal nutrition therapy  Outcome: Ongoing  Nutrition Problem: No nutrition diagnosis at this time  Intervention: Food and/or Nutrient Delivery: Continue current diet  Nutritional Goals: PO > 75% of meals

## 2018-11-13 NOTE — PROGRESS NOTES
Progress Note    SUBJECTIVE: Patient is seen for Active Problems:    UTI (urinary tract infection)    Dehydration    Debility    Dementia of the Alzheimer's type with early onset with behavioral disturbance    Syncope  Resolved Problems:    * No resolved hospital problems. *      OBJECTIVE:    Vitals:   Vitals:    11/13/18 0638   BP:    Pulse: 60   Resp:    Temp:    SpO2:      Weight: 146 lb 3.2 oz (66.3 kg)   Height: 5' 6\" (167.6 cm)   -----------------------------------------------------------------  Exam:    CONSTITUTIONAL:  Awake,not in any distress  EYES:  Lids and lashes normal, pupils equal, round and reactive to light, extra ocular muscles intact, sclera clear, conjunctiva normal  ENT:  Normocephalic, without obvious abnormality, atraumatic, sinuses nontender on palpation, external ears without lesions, oral pharynx with moist mucus membranes, tonsils without erythema or exudates, gums normal and good dentition. NECK:  Supple, symmetrical, trachea midline, no adenopathy, thyroid symmetric, not enlarged and no tenderness, skin normal  HEMATOLOGIC/LYMPHATICS:  no cervical lymphadenopathy  LUNGS:  No increased work of breathing, good air exchange, clear to auscultation bilaterally, no crackles or wheezing  CARDIOVASCULAR:  Normal apical impulse, regular rate and rhythm, normal S1 and S2, no S3 or S4, and no murmur noted  ABDOMEN:  No scars, normal bowel sounds, soft, non-distended, non-tender, no masses palpated, no hepatosplenomegally    MUSCULOSKELETAL:  there is no redness, warmth, or swelling of the joints  NEUROLOGIC:  No motor or sensory deficit  SKIN:  no bruising or bleeding  EXT:     no cyanosis, clubbing or edema present    -----------------------------------------------------------------  Diagnostic Data: Reviewed    More Recent Labs:    Results for orders placed or performed during the hospital encounter of 11/12/18   Culture Blood #1   Result Value Ref Range    Specimen Description . BLOOD

## 2018-11-13 NOTE — PROGRESS NOTES
Patient beginning to get agitated upon trying to ambulate to chair. Unwilling to follow instruction on walker use; writer and STNA assisted patient to chair by holding her hand.

## 2018-11-13 NOTE — PROGRESS NOTES
Nutrition Assessment    Type and Reason for Visit: Initial (partially completed malnutrition screen)    Nutrition Recommendations:   1. Continue current diet. 2. Monitor oral intakes (no data in flow sheet records due to new admission) and assess for ONS needs. Nutrition Assessment: Pt is at risk for altered nutritional status due to alzheimer's disease. Pt was noted to be aggressive and combative yesterday, threw her walker. telesitter in place. Lives at [facility]. Admitted wtih alzheimer's, UTI, dehydration, and sycope. Pt asleep    Malnutrition Assessment:  · Malnutrition Status: At risk for malnutrition  · Context: Acute illness or injury  · Findings of the 6 clinical characteristics of malnutrition (Minimum of 2 out of 6 clinical characteristics is required to make the diagnosis of moderate or severe Protein Calorie Malnutrition based on AND/ASPEN Guidelines):  1. Energy Intake- , not able to assess    2. Weight Loss-Unable to assess,    3. Fat Loss-Mild subcutaneous fat loss, Orbital  4. Muscle Loss-Mild muscle mass loss, Temples (temporalis muscle)  5. Fluid Accumulation-Mild fluid accumulation, Extremities  6.   Strength-Not measured    Nutrition Risk Level: Low, Moderate    Nutrition Diagnosis:   · Problem: No nutrition diagnosis at this time    Objective Information:  · Nutrition-Focused Physical Findings: Pt with mild malnutriton indices  · Wound Type: None  · Current Nutrition Therapies:  · Oral Diet Orders: General   · Oral Diet intake: Unable to assess  · Oral Nutrition Supplement (ONS) Orders: None  · Anthropometric Measures:  · Ht: 5' 6\" (167.6 cm)   · Current Body Wt: 146 lb 3.2 oz (66.3 kg)  · Admission Body Wt: 147 lb 4.8 oz (66.8 kg)  · Usual Body Wt: 145 lb 4.8 oz (65.9 kg) (9/16/18)  · Ideal Body Wt: 130 lb (59 kg),  · BMI Classification: BMI 18.5 - 24.9 Normal Weight  Recent Labs      11/12/18   1142  11/13/18   0540   NA  142  139   K  4.2  3.9   CL  102  103   CO2  31  29   BUN  22 15   CREATININE  0.82  0.79   GLUCOSE  95  92   ALT  10  9   ALKPHOS  93  86   GFR                              Lab Results   Component Value Date    LABALBU 3.5 11/13/2018      Nutrition Interventions:   Continue current diet  Continued Inpatient Monitoring, Education not appropriate at this time, Coordination of Care    Nutrition Evaluation:   · Evaluation: Goals set   · Goals: PO > 75% of meals    · Monitoring: Meal Intake, Weight, Pertinent Labs      Electronically signed by Meli Martinez RD, LD on 11/13/18 at 8:40 AM    Contact Number: [phone]

## 2018-11-14 VITALS
RESPIRATION RATE: 16 BRPM | WEIGHT: 148.5 LBS | BODY MASS INDEX: 23.87 KG/M2 | DIASTOLIC BLOOD PRESSURE: 56 MMHG | SYSTOLIC BLOOD PRESSURE: 122 MMHG | HEART RATE: 66 BPM | OXYGEN SATURATION: 95 % | TEMPERATURE: 98.4 F | HEIGHT: 66 IN

## 2018-11-14 LAB
ALBUMIN SERPL-MCNC: 3.4 G/DL (ref 3.5–5.2)
ALBUMIN/GLOBULIN RATIO: 1.4 (ref 1–2.5)
ALP BLD-CCNC: 77 U/L (ref 35–104)
ALT SERPL-CCNC: 8 U/L (ref 5–33)
ANION GAP SERPL CALCULATED.3IONS-SCNC: 8 MMOL/L (ref 9–17)
AST SERPL-CCNC: 13 U/L
BILIRUB SERPL-MCNC: 0.35 MG/DL (ref 0.3–1.2)
BUN BLDV-MCNC: 15 MG/DL (ref 8–23)
BUN/CREAT BLD: 19 (ref 9–20)
CALCIUM SERPL-MCNC: 8.3 MG/DL (ref 8.6–10.4)
CHLORIDE BLD-SCNC: 101 MMOL/L (ref 98–107)
CO2: 28 MMOL/L (ref 20–31)
CREAT SERPL-MCNC: 0.81 MG/DL (ref 0.5–0.9)
GFR AFRICAN AMERICAN: >60 ML/MIN
GFR NON-AFRICAN AMERICAN: >60 ML/MIN
GFR SERPL CREATININE-BSD FRML MDRD: ABNORMAL ML/MIN/{1.73_M2}
GFR SERPL CREATININE-BSD FRML MDRD: ABNORMAL ML/MIN/{1.73_M2}
GLUCOSE BLD-MCNC: 100 MG/DL (ref 70–99)
HCT VFR BLD CALC: 38.1 % (ref 36.3–47.1)
HEMOGLOBIN: 11.8 G/DL (ref 11.9–15.1)
MCH RBC QN AUTO: 30.6 PG (ref 25.2–33.5)
MCHC RBC AUTO-ENTMCNC: 31 G/DL (ref 28.4–34.8)
MCV RBC AUTO: 99 FL (ref 82.6–102.9)
NRBC AUTOMATED: 0 PER 100 WBC
PDW BLD-RTO: 13.4 % (ref 11.8–14.4)
PLATELET # BLD: 234 K/UL (ref 138–453)
PMV BLD AUTO: 11.1 FL (ref 8.1–13.5)
POTASSIUM SERPL-SCNC: 4.1 MMOL/L (ref 3.7–5.3)
RBC # BLD: 3.85 M/UL (ref 3.95–5.11)
SODIUM BLD-SCNC: 137 MMOL/L (ref 135–144)
TOTAL PROTEIN: 5.9 G/DL (ref 6.4–8.3)
WBC # BLD: 8.1 K/UL (ref 3.5–11.3)

## 2018-11-14 PROCEDURE — 80053 COMPREHEN METABOLIC PANEL: CPT

## 2018-11-14 PROCEDURE — 85027 COMPLETE CBC AUTOMATED: CPT

## 2018-11-14 PROCEDURE — 36415 COLL VENOUS BLD VENIPUNCTURE: CPT

## 2018-11-14 PROCEDURE — 6370000000 HC RX 637 (ALT 250 FOR IP): Performed by: PHYSICIAN ASSISTANT

## 2018-11-14 PROCEDURE — 6360000002 HC RX W HCPCS: Performed by: PHYSICIAN ASSISTANT

## 2018-11-14 PROCEDURE — 6370000000 HC RX 637 (ALT 250 FOR IP): Performed by: FAMILY MEDICINE

## 2018-11-14 PROCEDURE — G0378 HOSPITAL OBSERVATION PER HR: HCPCS

## 2018-11-14 RX ORDER — SULFAMETHOXAZOLE AND TRIMETHOPRIM 800; 160 MG/1; MG/1
1 TABLET ORAL 2 TIMES DAILY
Qty: 14 TABLET | Refills: 0 | DISCHARGE
Start: 2018-11-14 | End: 2018-11-24

## 2018-11-14 NOTE — DISCHARGE SUMMARY
Physician Discharge Summary       Patient ID:  Aditi Mckenna  [id number]  1940    Admission date: 11/12/2018    Discharge date: 11/14/2018     Admitting Physician: Florencia [address]ine[address] Rd, MD     Primary Care Physician: aVni Chase MD     Primary Discharge Diagnoses:   Patient Active Problem List    Diagnosis Date Noted    Urinary tract infection 11/13/2018    Syncope 11/12/2018    Dementia of the Alzheimer's type with early onset with behavioral disturbance 10/04/2018    Debility 10/01/2018    UTI (urinary tract infection) 09/14/2018    Dehydration 09/14/2018       Additional Diagnoses:       Diagnosis Date    Alzheimer's dementia        Physical exam:      -----------------------------------------------------------------  Exam:  GEN:   awake, no apparent distress  EYES: No gross abnormalities. NECK: normal, supple, no lymphadenopathy,  no carotid bruits  PULM: clear to auscultation bilaterally- no wheezes, rales or rhonchi, normal air movement, no respiratory distress  COR: regular rate & rhythm, 2/6 murmurs and no gallops  ABD:  soft, non-tender, non-distended, normal bowel sounds, no masses or organomegaly  EXT:   no cyanosis, clubbing or edema present    NEURO: no motor or sensory deficit,cognition impaired  SKIN:  no rashes or significant lesions  -----------------------------------------------------------------          Hospital Course: The patient was admitted for the above. She was treated with iv antibiotics and iv fluids and improved over the course of her hospitalization. She had episodes of agitation requiring psychotropics. Dehydration improved with hydration and urine culture grew MRSA and hence antibiotics changed to bactrim ds as per sensitivity.      Consultants:    · none    Procedures:    · none    Complications:   · none    Significant Diagnostic Studies:   Xr Chest Standard (2 Vw)    Result Date: 11/12/2018  EXAMINATION: TWO VIEWS OF THE CHEST 11/12/2018 12:53 pm COMPARISON: September 14, 2018 HISTORY: ORDERING SYSTEM PROVIDED HISTORY: AMS, vomiting, concern for aspiration TECHNOLOGIST PROVIDED HISTORY: AMS, vomiting, concern for aspiration FINDINGS: Cardiac monitoring leads overlie the chest.  Heart is not enlarged. Chronically elevated right hemidiaphragm is stable. Interstitial edema without effusion or pneumothorax. No focal consolidation. 1. Diffuse interstitial edema. 2. No focal airspace consolidation. Ct Head Wo Contrast    Result Date: 11/12/2018  EXAMINATION: CT OF THE HEAD WITHOUT CONTRAST  11/12/2018 12:36 pm TECHNIQUE: CT of the head was performed without the administration of intravenous contrast. Dose modulation, iterative reconstruction, and/or weight based adjustment of the mA/kV was utilized to reduce the radiation dose to as low as reasonably achievable. COMPARISON: 09/14/2018 HISTORY: ORDERING SYSTEM PROVIDED HISTORY: AMS TECHNOLOGIST PROVIDED HISTORY: FINDINGS: BRAIN/VENTRICLES: The ventricles and sulci are diffusely enlarged. Low attenuation is seen in the periventricular and subcortical white matter. No acute intracranial hemorrhage or acute infarct is identified ORBITS: The visualized portion of the orbits demonstrate no acute abnormality. SINUSES: The visualized paranasal sinuses and mastoid air cells demonstrate no acute abnormality. SOFT TISSUES/SKULL:  No acute abnormality of the visualized skull or soft tissues. No acute intracranial abnormality.  Diffuse atrophic changes with findings suggesting chronic microvascular ischemia     Recent Results (from the past 96 hour(s))   EKG 12 Lead    Collection Time: 11/12/18 11:03 AM   Result Value Ref Range    Ventricular Rate 60 BPM    Atrial Rate 60 BPM    P-R Interval 170 ms    QRS Duration 84 ms    Q-T Interval 428 ms    QTc Calculation (Bazett) 428 ms    P Axis 63 degrees    R Axis 41 degrees    T Axis 59 degrees   CBC Auto Differential    Collection Time: 11/12/18 11:42 AM   Result Value Ref Range Constipation Take 1 ounce daily as needed for bowel protocol. memantine ER (NAMENDA XR) 28 MG CP24 extended release capsule  Take 28 mg by mouth daily             oxybutynin (DITROPAN-XL) 5 MG extended release tablet  Take 5 mg by mouth daily             PARoxetine (PAXIL) 20 MG tablet  Take 20 mg by mouth every morning             QUEtiapine (SEROQUEL) 25 MG tablet  Take 25 mg by mouth 2 times daily              Sodium Phosphates (FLEET) 7-19 GM/118ML  Place 1 enema rectally once as needed Daily as needed for bowel protocol. sulfamethoxazole-trimethoprim (BACTRIM DS;SEPTRA DS) 800-160 MG per tablet  Take 1 tablet by mouth 2 times daily for 10 days                 · Resume all home medications unless otherwise directed  · Add bactrim ds bid for 10 days  · Stop taking     Patient Instructions:   · Activity: activity as tolerated  · Diet: regular diet  · Wound Care: none needed  · Other:   · Follow up with Dr Janel London  ·   as directed      Time Spent on discharge services is 25 minutes in the examination, evaluation, counseling and review of medications and discharge plan.     Signed:  Janel London M.D.  11/14/2018  7:26 AM

## 2018-11-14 NOTE — PROGRESS NOTES
Writer received call at [phone] of critical result of MRSA growing in patients urine sample. Writer called Dr. Edilia Linares at [phone] and Dr. Edilia Linares returned call at [phone] and gave new orders.

## 2018-11-14 NOTE — PROGRESS NOTES
Patient is discharge back to [facility]. Discharge paper work done and fax to [facility].   STEPAN Tyler

## 2018-11-15 LAB
CULTURE: ABNORMAL
Lab: ABNORMAL
ORGANISM: ABNORMAL
SPECIMEN DESCRIPTION: ABNORMAL
STATUS: ABNORMAL

## 2018-11-17 LAB
CULTURE: NORMAL
CULTURE: NORMAL
Lab: NORMAL
Lab: NORMAL
SPECIMEN DESCRIPTION: NORMAL
SPECIMEN DESCRIPTION: NORMAL
STATUS: NORMAL
STATUS: NORMAL

## 2018-11-26 ENCOUNTER — HOSPITAL ENCOUNTER (OUTPATIENT)
Age: 78
Setting detail: SPECIMEN
Discharge: HOME | End: 2018-11-26
Payer: MEDICARE

## 2018-11-26 PROCEDURE — 81001 URINALYSIS AUTO W/SCOPE: CPT

## 2018-11-27 LAB
-: ABNORMAL
AMORPHOUS: ABNORMAL
BACTERIA: ABNORMAL
BILIRUBIN URINE: NEGATIVE
CASTS UA: ABNORMAL /LPF
COLOR: YELLOW
COMMENT UA: ABNORMAL
CRYSTALS, UA: ABNORMAL /HPF
EPITHELIAL CELLS UA: ABNORMAL /HPF (ref 0–25)
GLUCOSE URINE: NEGATIVE
KETONES, URINE: ABNORMAL
LEUKOCYTE ESTERASE, URINE: NEGATIVE
MUCUS: ABNORMAL
NITRITE, URINE: NEGATIVE
OTHER OBSERVATIONS UA: ABNORMAL
PH UA: 5.5 (ref 5–9)
PROTEIN UA: NEGATIVE
RBC UA: ABNORMAL /HPF (ref 0–2)
RENAL EPITHELIAL, UA: ABNORMAL /HPF
SPECIFIC GRAVITY UA: 1.02 (ref 1.01–1.02)
TRICHOMONAS: ABNORMAL
TURBIDITY: CLEAR
URINE HGB: NEGATIVE
UROBILINOGEN, URINE: NORMAL
WBC UA: ABNORMAL /HPF (ref 0–5)
YEAST: ABNORMAL

## 2018-12-02 ENCOUNTER — HOSPITAL ENCOUNTER (OUTPATIENT)
Age: 78
Setting detail: SPECIMEN
Discharge: HOME | End: 2018-12-02
Payer: MEDICARE

## 2018-12-02 PROCEDURE — 81001 URINALYSIS AUTO W/SCOPE: CPT

## 2018-12-02 PROCEDURE — 87086 URINE CULTURE/COLONY COUNT: CPT

## 2018-12-03 LAB
-: ABNORMAL
AMORPHOUS: ABNORMAL
BACTERIA: ABNORMAL
BILIRUBIN URINE: NEGATIVE
CASTS UA: ABNORMAL /LPF
COLOR: YELLOW
COMMENT UA: ABNORMAL
CRYSTALS, UA: ABNORMAL /HPF
EPITHELIAL CELLS UA: ABNORMAL /HPF (ref 0–25)
GLUCOSE URINE: NEGATIVE
KETONES, URINE: NEGATIVE
LEUKOCYTE ESTERASE, URINE: NEGATIVE
MUCUS: ABNORMAL
NITRITE, URINE: NEGATIVE
OTHER OBSERVATIONS UA: ABNORMAL
PH UA: 6 (ref 5–9)
PROTEIN UA: NEGATIVE
RBC UA: ABNORMAL /HPF (ref 0–2)
RENAL EPITHELIAL, UA: ABNORMAL /HPF
SPECIFIC GRAVITY UA: 1.01 (ref 1.01–1.02)
TRICHOMONAS: ABNORMAL
TURBIDITY: CLEAR
URINE HGB: NEGATIVE
UROBILINOGEN, URINE: NORMAL
WBC UA: ABNORMAL /HPF (ref 0–5)
YEAST: ABNORMAL

## 2018-12-04 LAB
CULTURE: NO GROWTH
Lab: NORMAL
SPECIMEN DESCRIPTION: NORMAL
STATUS: NORMAL

## 2018-12-12 PROBLEM — N39.0 UTI (URINARY TRACT INFECTION): Status: RESOLVED | Noted: 2018-09-14 | Resolved: 2018-12-12

## 2018-12-13 PROBLEM — E86.0 DEHYDRATION: Status: RESOLVED | Noted: 2018-09-14 | Resolved: 2018-12-13

## 2018-12-14 PROBLEM — N39.0 URINARY TRACT INFECTION: Status: RESOLVED | Noted: 2018-11-13 | Resolved: 2018-12-14

## 2019-01-13 ENCOUNTER — HOSPITAL ENCOUNTER (OUTPATIENT)
Age: 79
Setting detail: SPECIMEN
Discharge: HOME | End: 2019-01-13
Payer: MEDICARE

## 2019-01-13 LAB
-: NORMAL
REASON FOR REJECTION: NORMAL
ZZ NTE CLEAN UP: ORDERED TEST: NORMAL
ZZ NTE WITH NAME CLEAN UP: SPECIMEN SOURCE: NORMAL

## 2019-01-13 PROCEDURE — 81001 URINALYSIS AUTO W/SCOPE: CPT

## 2019-01-13 PROCEDURE — 87186 SC STD MICRODIL/AGAR DIL: CPT

## 2019-01-13 PROCEDURE — 87086 URINE CULTURE/COLONY COUNT: CPT

## 2019-01-13 PROCEDURE — 87077 CULTURE AEROBIC IDENTIFY: CPT

## 2019-01-14 LAB
-: ABNORMAL
AMORPHOUS: ABNORMAL
BACTERIA: ABNORMAL
BILIRUBIN URINE: NEGATIVE
CASTS UA: ABNORMAL /LPF
COLOR: YELLOW
COMMENT UA: ABNORMAL
CRYSTALS, UA: ABNORMAL /HPF
EPITHELIAL CELLS UA: ABNORMAL /HPF (ref 0–25)
GLUCOSE URINE: NEGATIVE
KETONES, URINE: NEGATIVE
LEUKOCYTE ESTERASE, URINE: ABNORMAL
MUCUS: ABNORMAL
NITRITE, URINE: NEGATIVE
OTHER OBSERVATIONS UA: ABNORMAL
PH UA: 6 (ref 5–9)
PROTEIN UA: NEGATIVE
RBC UA: ABNORMAL /HPF (ref 0–2)
RENAL EPITHELIAL, UA: ABNORMAL /HPF
SPECIFIC GRAVITY UA: 1.01 (ref 1.01–1.02)
TRICHOMONAS: ABNORMAL
TURBIDITY: ABNORMAL
URINE HGB: NEGATIVE
UROBILINOGEN, URINE: NORMAL
WBC UA: ABNORMAL /HPF (ref 0–5)
YEAST: ABNORMAL

## 2019-01-15 LAB
CULTURE: ABNORMAL
Lab: ABNORMAL
ORGANISM: ABNORMAL
SPECIMEN DESCRIPTION: ABNORMAL
STATUS: ABNORMAL

## 2019-01-16 PROBLEM — F32.9 MAJOR DEPRESSIVE DISORDER, SINGLE EPISODE, UNSPECIFIED: Status: ACTIVE | Noted: 2019-01-16

## 2019-01-16 PROBLEM — E44.1 MILD PROTEIN-CALORIE MALNUTRITION (HCC): Status: ACTIVE | Noted: 2019-01-16

## 2019-01-24 PROBLEM — E44.1 MILD PROTEIN-CALORIE MALNUTRITION (HCC): Status: RESOLVED | Noted: 2019-01-16 | Resolved: 2019-01-24

## 2019-01-24 PROBLEM — F32.4 MAJOR DEPRESSIVE DISORDER WITH SINGLE EPISODE, IN PARTIAL REMISSION (HCC): Status: ACTIVE | Noted: 2019-01-16

## 2019-01-24 PROBLEM — R53.81 DEBILITY: Status: RESOLVED | Noted: 2018-10-01 | Resolved: 2019-01-24

## 2019-02-27 ENCOUNTER — HOSPITAL ENCOUNTER (OUTPATIENT)
Age: 79
Setting detail: SPECIMEN
Discharge: HOME | End: 2019-02-27
Payer: MEDICARE

## 2019-02-27 LAB
ANION GAP SERPL CALCULATED.3IONS-SCNC: 11 MMOL/L (ref 9–17)
BUN BLDV-MCNC: 18 MG/DL (ref 8–23)
BUN/CREAT BLD: 20 (ref 9–20)
CALCIUM SERPL-MCNC: 9.2 MG/DL (ref 8.6–10.4)
CHLORIDE BLD-SCNC: 108 MMOL/L (ref 98–107)
CO2: 27 MMOL/L (ref 20–31)
CREAT SERPL-MCNC: 0.88 MG/DL (ref 0.5–0.9)
GFR AFRICAN AMERICAN: >60 ML/MIN
GFR NON-AFRICAN AMERICAN: >60 ML/MIN
GFR SERPL CREATININE-BSD FRML MDRD: ABNORMAL ML/MIN/{1.73_M2}
GFR SERPL CREATININE-BSD FRML MDRD: ABNORMAL ML/MIN/{1.73_M2}
GLUCOSE BLD-MCNC: 84 MG/DL (ref 70–99)
POTASSIUM SERPL-SCNC: 5 MMOL/L (ref 3.7–5.3)
SODIUM BLD-SCNC: 146 MMOL/L (ref 135–144)

## 2019-02-27 PROCEDURE — 36415 COLL VENOUS BLD VENIPUNCTURE: CPT

## 2019-02-27 PROCEDURE — P9604 ONE-WAY ALLOW PRORATED TRIP: HCPCS

## 2019-02-27 PROCEDURE — 80048 BASIC METABOLIC PNL TOTAL CA: CPT

## 2019-03-31 ENCOUNTER — HOSPITAL ENCOUNTER (OUTPATIENT)
Age: 79
Setting detail: SPECIMEN
Discharge: HOME | End: 2019-03-31
Payer: MEDICARE

## 2019-03-31 PROCEDURE — 87088 URINE BACTERIA CULTURE: CPT

## 2019-03-31 PROCEDURE — 87186 SC STD MICRODIL/AGAR DIL: CPT

## 2019-03-31 PROCEDURE — 81001 URINALYSIS AUTO W/SCOPE: CPT

## 2019-03-31 PROCEDURE — 87086 URINE CULTURE/COLONY COUNT: CPT

## 2019-04-01 LAB
-: ABNORMAL
AMORPHOUS: ABNORMAL
BACTERIA: ABNORMAL
BILIRUBIN URINE: NEGATIVE
CASTS UA: ABNORMAL /LPF
COLOR: YELLOW
COMMENT UA: ABNORMAL
CRYSTALS, UA: ABNORMAL /HPF
EPITHELIAL CELLS UA: ABNORMAL /HPF (ref 0–25)
GLUCOSE URINE: NEGATIVE
KETONES, URINE: NEGATIVE
LEUKOCYTE ESTERASE, URINE: ABNORMAL
MUCUS: ABNORMAL
NITRITE, URINE: POSITIVE
OTHER OBSERVATIONS UA: ABNORMAL
PH UA: 7.5 (ref 5–9)
PROTEIN UA: ABNORMAL
RBC UA: ABNORMAL /HPF (ref 0–2)
RENAL EPITHELIAL, UA: ABNORMAL /HPF
SPECIFIC GRAVITY UA: 1.01 (ref 1.01–1.02)
TRICHOMONAS: ABNORMAL
TURBIDITY: ABNORMAL
URINE HGB: ABNORMAL
UROBILINOGEN, URINE: NORMAL
WBC UA: ABNORMAL /HPF (ref 0–5)
YEAST: ABNORMAL

## 2019-04-02 LAB
CULTURE: ABNORMAL
Lab: ABNORMAL
SPECIMEN DESCRIPTION: ABNORMAL

## 2020-06-25 ENCOUNTER — HOSPITAL ENCOUNTER (OUTPATIENT)
Age: 80
Setting detail: SPECIMEN
Discharge: HOME | End: 2020-06-25
Payer: MEDICARE

## 2020-06-26 ENCOUNTER — HOSPITAL ENCOUNTER (OUTPATIENT)
Age: 80
Setting detail: SPECIMEN
Discharge: HOME | End: 2020-06-26
Payer: MEDICARE

## 2020-06-26 LAB
ALBUMIN SERPL-MCNC: 3.3 G/DL (ref 3.5–5.2)
ALBUMIN/GLOBULIN RATIO: 1.3 (ref 1–2.5)
ALP BLD-CCNC: 85 U/L (ref 35–104)
ALT SERPL-CCNC: 16 U/L (ref 5–33)
AST SERPL-CCNC: 17 U/L
BILIRUB SERPL-MCNC: 0.27 MG/DL (ref 0.3–1.2)
BILIRUBIN DIRECT: <0.08 MG/DL
BILIRUBIN, INDIRECT: ABNORMAL MG/DL (ref 0–1)
GLOBULIN: ABNORMAL G/DL (ref 1.5–3.8)
HCT VFR BLD CALC: 39.4 % (ref 36.3–47.1)
HEMOGLOBIN: 12.3 G/DL (ref 11.9–15.1)
MCH RBC QN AUTO: 30.8 PG (ref 25.2–33.5)
MCHC RBC AUTO-ENTMCNC: 31.2 G/DL (ref 28.4–34.8)
MCV RBC AUTO: 98.7 FL (ref 82.6–102.9)
NRBC AUTOMATED: 0 PER 100 WBC
PDW BLD-RTO: 13 % (ref 11.8–14.4)
PLATELET # BLD: 226 K/UL (ref 138–453)
PMV BLD AUTO: 11.8 FL (ref 8.1–13.5)
RBC # BLD: 3.99 M/UL (ref 3.95–5.11)
TOTAL PROTEIN: 5.9 G/DL (ref 6.4–8.3)
VALPROIC ACID LEVEL: 26 UG/ML (ref 50–125)
VALPROIC DATE LAST DOSE: ABNORMAL
VALPROIC DOSE AMOUNT: ABNORMAL
VALPROIC TIME LAST DOSE: ABNORMAL
WBC # BLD: 6 K/UL (ref 3.5–11.3)

## 2020-06-26 PROCEDURE — P9603 ONE-WAY ALLOW PRORATED MILES: HCPCS

## 2020-06-26 PROCEDURE — 80164 ASSAY DIPROPYLACETIC ACD TOT: CPT

## 2020-06-26 PROCEDURE — 85027 COMPLETE CBC AUTOMATED: CPT

## 2020-06-26 PROCEDURE — 80076 HEPATIC FUNCTION PANEL: CPT

## 2020-06-26 PROCEDURE — 36415 COLL VENOUS BLD VENIPUNCTURE: CPT

## 2020-09-21 ENCOUNTER — HOSPITAL ENCOUNTER (INPATIENT)
Age: 80
LOS: 1 days | Discharge: HOSPICE | DRG: 177 | End: 2020-09-22
Attending: EMERGENCY MEDICINE | Admitting: FAMILY MEDICINE
Payer: MEDICARE

## 2020-09-21 PROCEDURE — 99285 EMERGENCY DEPT VISIT HI MDM: CPT

## 2020-09-22 ENCOUNTER — APPOINTMENT (OUTPATIENT)
Dept: GENERAL RADIOLOGY | Age: 80
DRG: 177 | End: 2020-09-22
Payer: MEDICARE

## 2020-09-22 VITALS
DIASTOLIC BLOOD PRESSURE: 61 MMHG | RESPIRATION RATE: 22 BRPM | TEMPERATURE: 99.3 F | SYSTOLIC BLOOD PRESSURE: 118 MMHG | WEIGHT: 190 LBS | HEART RATE: 108 BPM | HEIGHT: 67 IN | OXYGEN SATURATION: 92 % | BODY MASS INDEX: 29.82 KG/M2

## 2020-09-22 PROBLEM — J96.01 ACUTE RESPIRATORY FAILURE WITH HYPOXIA (HCC): Status: ACTIVE | Noted: 2020-09-22

## 2020-09-22 PROBLEM — J69.0 ACUTE ASPIRATION PNEUMONIA (HCC): Status: ACTIVE | Noted: 2020-09-22

## 2020-09-22 LAB
ABSOLUTE EOS #: <0.03 K/UL (ref 0–0.44)
ABSOLUTE IMMATURE GRANULOCYTE: 0.03 K/UL (ref 0–0.3)
ABSOLUTE LYMPH #: 0.87 K/UL (ref 1.1–3.7)
ABSOLUTE MONO #: 0.6 K/UL (ref 0.1–1.2)
ALBUMIN SERPL-MCNC: 4 G/DL (ref 3.5–5.2)
ALBUMIN/GLOBULIN RATIO: 1.3 (ref 1–2.5)
ALP BLD-CCNC: 84 U/L (ref 35–104)
ALT SERPL-CCNC: 16 U/L (ref 5–33)
ANION GAP SERPL CALCULATED.3IONS-SCNC: 11 MMOL/L (ref 9–17)
AST SERPL-CCNC: 19 U/L
BASOPHILS # BLD: 0 % (ref 0–2)
BASOPHILS ABSOLUTE: <0.03 K/UL (ref 0–0.2)
BILIRUB SERPL-MCNC: 0.23 MG/DL (ref 0.3–1.2)
BUN BLDV-MCNC: 28 MG/DL (ref 8–23)
BUN/CREAT BLD: 36 (ref 9–20)
CALCIUM SERPL-MCNC: 9 MG/DL (ref 8.6–10.4)
CHLORIDE BLD-SCNC: 104 MMOL/L (ref 98–107)
CO2: 27 MMOL/L (ref 20–31)
CREAT SERPL-MCNC: 0.77 MG/DL (ref 0.5–0.9)
DIFFERENTIAL TYPE: ABNORMAL
EOSINOPHILS RELATIVE PERCENT: 0 % (ref 1–4)
GFR AFRICAN AMERICAN: >60 ML/MIN
GFR NON-AFRICAN AMERICAN: >60 ML/MIN
GFR SERPL CREATININE-BSD FRML MDRD: ABNORMAL ML/MIN/{1.73_M2}
GFR SERPL CREATININE-BSD FRML MDRD: ABNORMAL ML/MIN/{1.73_M2}
GLUCOSE BLD-MCNC: 155 MG/DL (ref 74–100)
GLUCOSE BLD-MCNC: 201 MG/DL (ref 70–99)
HCT VFR BLD CALC: 41 % (ref 36.3–47.1)
HEMOGLOBIN: 13.1 G/DL (ref 11.9–15.1)
IMMATURE GRANULOCYTES: 0 %
LACTIC ACID, WHOLE BLOOD: NORMAL MMOL/L (ref 0.7–2.1)
LACTIC ACID: 2.2 MMOL/L (ref 0.5–2.2)
LYMPHOCYTES # BLD: 7 % (ref 24–43)
MCH RBC QN AUTO: 31.3 PG (ref 25.2–33.5)
MCHC RBC AUTO-ENTMCNC: 32 G/DL (ref 28.4–34.8)
MCV RBC AUTO: 98.1 FL (ref 82.6–102.9)
MONOCYTES # BLD: 5 % (ref 3–12)
NRBC AUTOMATED: 0 PER 100 WBC
PDW BLD-RTO: 12.8 % (ref 11.8–14.4)
PLATELET # BLD: 263 K/UL (ref 138–453)
PLATELET ESTIMATE: ABNORMAL
PMV BLD AUTO: 11.4 FL (ref 8.1–13.5)
POTASSIUM SERPL-SCNC: 3.7 MMOL/L (ref 3.7–5.3)
RBC # BLD: 4.18 M/UL (ref 3.95–5.11)
RBC # BLD: ABNORMAL 10*6/UL
SARS-COV-2, RAPID: NOT DETECTED
SARS-COV-2: NORMAL
SARS-COV-2: NORMAL
SEG NEUTROPHILS: 88 % (ref 36–65)
SEGMENTED NEUTROPHILS ABSOLUTE COUNT: 10.27 K/UL (ref 1.5–8.1)
SODIUM BLD-SCNC: 142 MMOL/L (ref 135–144)
SOURCE: NORMAL
TOTAL PROTEIN: 7 G/DL (ref 6.4–8.3)
WBC # BLD: 11.8 K/UL (ref 3.5–11.3)
WBC # BLD: ABNORMAL 10*3/UL

## 2020-09-22 PROCEDURE — 2500000003 HC RX 250 WO HCPCS: Performed by: INTERNAL MEDICINE

## 2020-09-22 PROCEDURE — 6370000000 HC RX 637 (ALT 250 FOR IP): Performed by: INTERNAL MEDICINE

## 2020-09-22 PROCEDURE — 94664 DEMO&/EVAL PT USE INHALER: CPT

## 2020-09-22 PROCEDURE — 87040 BLOOD CULTURE FOR BACTERIA: CPT

## 2020-09-22 PROCEDURE — 6360000002 HC RX W HCPCS: Performed by: INTERNAL MEDICINE

## 2020-09-22 PROCEDURE — 82947 ASSAY GLUCOSE BLOOD QUANT: CPT

## 2020-09-22 PROCEDURE — 71045 X-RAY EXAM CHEST 1 VIEW: CPT

## 2020-09-22 PROCEDURE — 85025 COMPLETE CBC W/AUTO DIFF WBC: CPT

## 2020-09-22 PROCEDURE — 94761 N-INVAS EAR/PLS OXIMETRY MLT: CPT

## 2020-09-22 PROCEDURE — 94640 AIRWAY INHALATION TREATMENT: CPT

## 2020-09-22 PROCEDURE — 96365 THER/PROPH/DIAG IV INF INIT: CPT

## 2020-09-22 PROCEDURE — 83605 ASSAY OF LACTIC ACID: CPT

## 2020-09-22 PROCEDURE — 6360000002 HC RX W HCPCS: Performed by: NURSE PRACTITIONER

## 2020-09-22 PROCEDURE — 6370000000 HC RX 637 (ALT 250 FOR IP): Performed by: FAMILY MEDICINE

## 2020-09-22 PROCEDURE — 2580000003 HC RX 258: Performed by: EMERGENCY MEDICINE

## 2020-09-22 PROCEDURE — 1200000000 HC SEMI PRIVATE

## 2020-09-22 PROCEDURE — 2500000003 HC RX 250 WO HCPCS: Performed by: EMERGENCY MEDICINE

## 2020-09-22 PROCEDURE — 6360000002 HC RX W HCPCS: Performed by: EMERGENCY MEDICINE

## 2020-09-22 PROCEDURE — 2700000000 HC OXYGEN THERAPY PER DAY

## 2020-09-22 PROCEDURE — 83036 HEMOGLOBIN GLYCOSYLATED A1C: CPT

## 2020-09-22 PROCEDURE — 2580000003 HC RX 258: Performed by: INTERNAL MEDICINE

## 2020-09-22 PROCEDURE — U0002 COVID-19 LAB TEST NON-CDC: HCPCS

## 2020-09-22 PROCEDURE — 36415 COLL VENOUS BLD VENIPUNCTURE: CPT

## 2020-09-22 PROCEDURE — 80053 COMPREHEN METABOLIC PANEL: CPT

## 2020-09-22 RX ORDER — ACETAMINOPHEN 650 MG/1
650 SUPPOSITORY RECTAL EVERY 6 HOURS PRN
Status: DISCONTINUED | OUTPATIENT
Start: 2020-09-22 | End: 2020-09-22 | Stop reason: HOSPADM

## 2020-09-22 RX ORDER — SODIUM CHLORIDE 0.9 % (FLUSH) 0.9 %
10 SYRINGE (ML) INJECTION PRN
Status: DISCONTINUED | OUTPATIENT
Start: 2020-09-22 | End: 2020-09-22 | Stop reason: HOSPADM

## 2020-09-22 RX ORDER — ONDANSETRON 2 MG/ML
4 INJECTION INTRAMUSCULAR; INTRAVENOUS EVERY 6 HOURS PRN
Status: DISCONTINUED | OUTPATIENT
Start: 2020-09-22 | End: 2020-09-22 | Stop reason: HOSPADM

## 2020-09-22 RX ORDER — ACETAMINOPHEN 325 MG/1
650 TABLET ORAL EVERY 4 HOURS PRN
Status: DISCONTINUED | OUTPATIENT
Start: 2020-09-22 | End: 2020-09-22

## 2020-09-22 RX ORDER — IPRATROPIUM BROMIDE AND ALBUTEROL SULFATE 2.5; .5 MG/3ML; MG/3ML
1 SOLUTION RESPIRATORY (INHALATION)
Status: DISCONTINUED | OUTPATIENT
Start: 2020-09-22 | End: 2020-09-22

## 2020-09-22 RX ORDER — CLINDAMYCIN PHOSPHATE 600 MG/50ML
600 INJECTION INTRAVENOUS ONCE
Status: DISCONTINUED | OUTPATIENT
Start: 2020-09-22 | End: 2020-09-22

## 2020-09-22 RX ORDER — DONEPEZIL HYDROCHLORIDE 5 MG/1
10 TABLET, FILM COATED ORAL DAILY
Status: DISCONTINUED | OUTPATIENT
Start: 2020-09-22 | End: 2020-09-22 | Stop reason: HOSPADM

## 2020-09-22 RX ORDER — SODIUM CHLORIDE 9 MG/ML
INJECTION, SOLUTION INTRAVENOUS CONTINUOUS
Status: DISCONTINUED | OUTPATIENT
Start: 2020-09-22 | End: 2020-09-22 | Stop reason: HOSPADM

## 2020-09-22 RX ORDER — QUETIAPINE FUMARATE 25 MG/1
25 TABLET, FILM COATED ORAL 2 TIMES DAILY
Status: DISCONTINUED | OUTPATIENT
Start: 2020-09-22 | End: 2020-09-22 | Stop reason: HOSPADM

## 2020-09-22 RX ORDER — MORPHINE SULFATE 2 MG/ML
2 INJECTION, SOLUTION INTRAMUSCULAR; INTRAVENOUS EVERY 4 HOURS PRN
Status: DISCONTINUED | OUTPATIENT
Start: 2020-09-22 | End: 2020-09-22 | Stop reason: HOSPADM

## 2020-09-22 RX ORDER — BISACODYL 10 MG
10 SUPPOSITORY, RECTAL RECTAL DAILY PRN
Status: DISCONTINUED | OUTPATIENT
Start: 2020-09-22 | End: 2020-09-22 | Stop reason: HOSPADM

## 2020-09-22 RX ORDER — OXYBUTYNIN CHLORIDE 5 MG/1
5 TABLET, EXTENDED RELEASE ORAL DAILY
Status: DISCONTINUED | OUTPATIENT
Start: 2020-09-22 | End: 2020-09-22 | Stop reason: HOSPADM

## 2020-09-22 RX ORDER — 0.9 % SODIUM CHLORIDE 0.9 %
1000 INTRAVENOUS SOLUTION INTRAVENOUS ONCE
Status: DISCONTINUED | OUTPATIENT
Start: 2020-09-22 | End: 2020-09-22

## 2020-09-22 RX ORDER — BUSPIRONE HYDROCHLORIDE 10 MG/1
30 TABLET ORAL 2 TIMES DAILY
Status: DISCONTINUED | OUTPATIENT
Start: 2020-09-22 | End: 2020-09-22 | Stop reason: HOSPADM

## 2020-09-22 RX ORDER — DEXTROSE MONOHYDRATE 50 MG/ML
100 INJECTION, SOLUTION INTRAVENOUS PRN
Status: DISCONTINUED | OUTPATIENT
Start: 2020-09-22 | End: 2020-09-22 | Stop reason: HOSPADM

## 2020-09-22 RX ORDER — ALBUTEROL SULFATE 2.5 MG/3ML
2.5 SOLUTION RESPIRATORY (INHALATION) EVERY 4 HOURS PRN
Status: DISCONTINUED | OUTPATIENT
Start: 2020-09-22 | End: 2020-09-22 | Stop reason: HOSPADM

## 2020-09-22 RX ORDER — PAROXETINE HYDROCHLORIDE 20 MG/1
20 TABLET, FILM COATED ORAL EVERY MORNING
Status: DISCONTINUED | OUTPATIENT
Start: 2020-09-22 | End: 2020-09-22 | Stop reason: HOSPADM

## 2020-09-22 RX ORDER — POLYETHYLENE GLYCOL 3350 17 G/17G
17 POWDER, FOR SOLUTION ORAL DAILY PRN
Status: DISCONTINUED | OUTPATIENT
Start: 2020-09-22 | End: 2020-09-22 | Stop reason: HOSPADM

## 2020-09-22 RX ORDER — MEMANTINE HYDROCHLORIDE 28 MG/1
28 CAPSULE, EXTENDED RELEASE ORAL DAILY
Status: DISCONTINUED | OUTPATIENT
Start: 2020-09-22 | End: 2020-09-22 | Stop reason: HOSPADM

## 2020-09-22 RX ORDER — LANOLIN ALCOHOL/MO/W.PET/CERES
1000 CREAM (GRAM) TOPICAL DAILY
Status: DISCONTINUED | OUTPATIENT
Start: 2020-09-22 | End: 2020-09-22 | Stop reason: HOSPADM

## 2020-09-22 RX ORDER — NICOTINE POLACRILEX 4 MG
15 LOZENGE BUCCAL PRN
Status: DISCONTINUED | OUTPATIENT
Start: 2020-09-22 | End: 2020-09-22 | Stop reason: HOSPADM

## 2020-09-22 RX ORDER — IPRATROPIUM BROMIDE AND ALBUTEROL SULFATE 2.5; .5 MG/3ML; MG/3ML
1 SOLUTION RESPIRATORY (INHALATION) 4 TIMES DAILY
Status: DISCONTINUED | OUTPATIENT
Start: 2020-09-22 | End: 2020-09-22 | Stop reason: HOSPADM

## 2020-09-22 RX ORDER — DIVALPROEX SODIUM 125 MG/1
125 TABLET, DELAYED RELEASE ORAL 3 TIMES DAILY
Status: DISCONTINUED | OUTPATIENT
Start: 2020-09-22 | End: 2020-09-22 | Stop reason: HOSPADM

## 2020-09-22 RX ORDER — SODIUM CHLORIDE 0.9 % (FLUSH) 0.9 %
10 SYRINGE (ML) INJECTION EVERY 12 HOURS SCHEDULED
Status: DISCONTINUED | OUTPATIENT
Start: 2020-09-22 | End: 2020-09-22 | Stop reason: HOSPADM

## 2020-09-22 RX ORDER — ACETAMINOPHEN 325 MG/1
650 TABLET ORAL EVERY 6 HOURS PRN
Status: DISCONTINUED | OUTPATIENT
Start: 2020-09-22 | End: 2020-09-22 | Stop reason: HOSPADM

## 2020-09-22 RX ORDER — CLINDAMYCIN PHOSPHATE 600 MG/50ML
600 INJECTION INTRAVENOUS EVERY 8 HOURS
Status: DISCONTINUED | OUTPATIENT
Start: 2020-09-22 | End: 2020-09-22 | Stop reason: HOSPADM

## 2020-09-22 RX ORDER — DEXTROSE MONOHYDRATE 25 G/50ML
12.5 INJECTION, SOLUTION INTRAVENOUS PRN
Status: DISCONTINUED | OUTPATIENT
Start: 2020-09-22 | End: 2020-09-22 | Stop reason: HOSPADM

## 2020-09-22 RX ORDER — PROMETHAZINE HYDROCHLORIDE 25 MG/1
12.5 TABLET ORAL EVERY 6 HOURS PRN
Status: DISCONTINUED | OUTPATIENT
Start: 2020-09-22 | End: 2020-09-22 | Stop reason: HOSPADM

## 2020-09-22 RX ADMIN — CEFEPIME HYDROCHLORIDE 1 G: 1 INJECTION, POWDER, FOR SOLUTION INTRAMUSCULAR; INTRAVENOUS at 00:44

## 2020-09-22 RX ADMIN — MORPHINE SULFATE 2 MG: 2 INJECTION, SOLUTION INTRAMUSCULAR; INTRAVENOUS at 09:32

## 2020-09-22 RX ADMIN — MORPHINE SULFATE 2 MG: 2 INJECTION, SOLUTION INTRAMUSCULAR; INTRAVENOUS at 14:39

## 2020-09-22 RX ADMIN — ENOXAPARIN SODIUM 40 MG: 40 INJECTION SUBCUTANEOUS at 09:32

## 2020-09-22 RX ADMIN — Medication 10 ML: at 09:38

## 2020-09-22 RX ADMIN — CLINDAMYCIN PHOSPHATE 600 MG: 600 INJECTION, SOLUTION INTRAVENOUS at 09:54

## 2020-09-22 RX ADMIN — SODIUM CHLORIDE: 9 INJECTION, SOLUTION INTRAVENOUS at 02:07

## 2020-09-22 RX ADMIN — IPRATROPIUM BROMIDE AND ALBUTEROL SULFATE 1 AMPULE: .5; 3 SOLUTION RESPIRATORY (INHALATION) at 08:50

## 2020-09-22 ASSESSMENT — PAIN SCALES - GENERAL
PAINLEVEL_OUTOF10: 0
PAINLEVEL_OUTOF10: 0

## 2020-09-22 ASSESSMENT — PAIN SCALES - PAIN ASSESSMENT IN ADVANCED DEMENTIA (PAINAD)
CONSOLABILITY: 0
BREATHING: 2
BODYLANGUAGE: 0
FACIALEXPRESSION: 0
NEGVOCALIZATION: 0
TOTALSCORE: 2

## 2020-09-22 NOTE — DISCHARGE INSTR - COC
Continuity of Care Form    Patient Name: Namita Mcrae   :  1940  MRN:  [id number]    Admit date:  2020  Discharge date:  20    Code Status Order: [facility]   Advance Directives:   [facility] Documentation       Date/Time Healthcare Directive Type of Healthcare Directive Copy in [address] Agent's Name Healthcare Agent's Phone Number    [phone]  Unknown, patient unable to respond due to medical condition -- -- -- -- --            Admitting Physician:  Tapan Houston MD  PCP: Tapan Houston MD    Discharging Nurse:  Anna Arango RN  Discharging Hospital Unit/Room#: 0316/0316-01  Discharging Unit Phone Number: [phone]    Emergency Contact:   Extended Emergency Contact Information  Primary Emergency Contact: Stephen Fournier   [address] Phone: [phone]  Relation: Spouse  Secondary Emergency Contact: Joshua [address] Phone: [phone]  Relation: Child    Past Surgical History:  Past Surgical History:   Procedure Laterality Date    APPENDECTOMY      EYE SURGERY      bilateral cataracts    KNEE ARTHROSCOPY         Immunization History:   Immunization History   Administered Date(s) Administered    Influenza, Quadv, 6 mo and older, IM, PF (Flulaval, Fluarix) 2018       Active Problems:  Patient Active Problem List   Diagnosis Code    Dementia of the Alzheimer's type with early onset with behavioral disturbance ([facility] [address] G30.0, F02.81    Syncope R55    Major depressive disorder with single episode, in partial remission ([address] F32.4    Acute aspiration pneumonia ([facility] [address] J69.0    Acute respiratory failure with hypoxia ([address] J96.01       Isolation/Infection:   Isolation            No Isolation          Patient Infection Status       Infection Onset Added Last Indicated Last Indicated By Review Planned Expiration Resolved Resolved By    COVID-19 Rule Out 20 COVID-19 (Ordered) 09/29/20 10/06/20      MRSA  11/14/18 11/14/18 Romel Aguilar RN        Urine 11/2018            Nurse Assessment:  Last Vital Signs: /61   Pulse 108   Temp 98.8 °F (37.1 °C) (Temporal)   Resp 30   Wt 190 lb (86.2 kg)   SpO2 92%   BMI 30.67 kg/m²     Last documented pain score (0-10 scale): Pain Level: 0  Last Weight:   Wt Readings from Last 1 Encounters:   09/22/20 190 lb (86.2 kg)     Mental Status:  disoriented    IV Access:  - None    Nursing Mobility/ADLs:  Walking   Dependent  Transfer  Dependent  Bathing  Dependent  Dressing  Dependent  Toileting  Dependent  Feeding  Dependent  Med Admin  Dependent  Med Delivery   No PO meds given d/t high aspiration risk    Wound Care Documentation and Therapy:        Elimination:  Continence:   · Bowel: No  · Bladder: No  Urinary Catheter: None   Colostomy/Ileostomy/Ileal Conduit: No       Date of Last BM: 9/20/20    Intake/Output Summary (Last 24 hours) at 9/22/2020 0940  Last data filed at 9/22/2020 0117  Gross per 24 hour   Intake 50 ml   Output --   Net 50 ml     I/O last 3 completed shifts: In: 48 [IV Piggyback:50]  Out: -     Safety Concerns:      At Risk for Falls and Aspiration Risk    Impairments/Disabilities:      unknown    Nutrition Therapy:  Current Nutrition Therapy:   - NPO    Routes of Feeding: None  Liquids: No Liquids  Daily Fluid Restriction: no  Last Modified Barium Swallow with Video (Video Swallowing Test): not done    Treatments at the Time of Hospital Discharge:   Respiratory Treatments: see MAR  Oxygen Therapy:  10L NRB mask  Ventilator:    - No ventilator support    Rehab Therapies: N/A  Weight Bearing Status/Restrictions: No weight bearing restirctions  Other Medical Equipment (for information only, NOT a DME order):  hospital bed  Other Treatments: barrier cream to buttocks    Patient's personal belongings (please select all that are sent with patient):  None    RN SIGNATURE:  Electronically signed by Rosangela Marks RN on 9/22/20 at 2:10 PM EDT    CASE MANAGEMENT/SOCIAL WORK SECTION    Inpatient Status Date: 9/22/2020    Readmission Risk Assessment Score:  Readmission Risk              Risk of Unplanned Readmission:        13           Discharging to Facility/ Agency   · Name: [facility]  · Address:[address]  · Phone:[phone]  · Fax:[phone]    Dialysis Facility (if applicable)   · Name:  · Address:  · Dialysis Schedule:  · Phone:  · Fax:    / signature: Electronically signed by STEPAN Koo on 9/22/20 at 9:40 AM EDT    PHYSICIAN SECTION    Prognosis: Poor    Condition at Discharge: Terminal    Rehab Potential (if transferring to Rehab): Poor    Recommended Labs or Other Treatments After Discharge: [facility]    Physician Certification: I certify the above information and transfer of Yvonne Carvalho  is necessary for the continuing treatment of the diagnosis listed and that she requires Intermediate Nursing Care/hospice for greater 30 days.      Update Admission H&P: No change in H&P    PHYSICIAN SIGNATURE:  Electronically signed by KIA Rosas CNP on 9/22/20 at 1:58 PM EDT

## 2020-09-22 NOTE — PROGRESS NOTES
Nutrition Assessment     Type and Reason for Visit: Initial    Nutrition Recommendations/Plan:  Food and fluid for comfort     Nutrition Assessment:  Inadequate nutrient intakes r/t alteration in respiratory status, AEB NPO r/t aspiration pneumonia. Planned hospice where food and fluid for comfort recommended. Malnutrition Assessment:  Malnutrition Status: Insufficient data    Nutrition Related Findings: RHONDA      Current Nutrition Therapies:    Diet NPO Effective Now Exceptions are: Sips with Meds    Anthropometric Measures:  · Height: 5' 7\" (170.2 cm)  · Current Body Wt: 190 lb (86.2 kg)   · BMI: 29.8    Nutrition Diagnosis:   · Inadequate oral intake related to impaired respiratory function as evidenced by NPO or clear liquid status due to medical condition    Lab Results   Component Value Date     09/22/2020    K 3.7 09/22/2020     09/22/2020    CO2 27 09/22/2020    BUN 28 (H) 09/22/2020    CREATININE 0.77 09/22/2020    GLUCOSE 201 (H) 09/22/2020    CALCIUM 9.0 09/22/2020    PROT 7.0 09/22/2020    LABALBU 4.0 09/22/2020    BILITOT 0.23 (L) 09/22/2020    ALKPHOS 84 09/22/2020    AST 19 09/22/2020    ALT 16 09/22/2020    LABGLOM >60 09/22/2020    GFRAA >60 09/22/2020    GLOB NOT REPORTED 06/26/2020     Nutrition Interventions:   Food and/or Nutrient Delivery:  Continue NPO  Nutrition Education/Counseling:  Education not indicated   Coordination of Nutrition Care:  No recommendation at this time    Goals:  Food and fluid for comfort if appropriate to allow       Nutrition Monitoring and Evaluation:   Behavioral-Environmental Outcomes:  (none)   Food/Nutrient Intake Outcomes:   Other (Comment)(comfort)  Physical Signs/Symptoms Outcomes:  Chewing or Swallowing     Discharge Planning:    No discharge needs at this time     Electronically signed by Thang Heredia RD, BERT on 9/22/20 at 11:09 AM EDT    Contact: [phone]

## 2020-09-22 NOTE — ED NOTES
Unable to obtain BCs at this time, lab called for draw. Dr. Janeen Armenta gave verbal orders to continue ATB before [facility] drawn.  See [facility] for [address], RN  09/22/20 8309

## 2020-09-22 NOTE — ED NOTES
Pt presents to ED from SNF, pt SpO2 82% on 4L NC, NRB applied. SpO2 90% on NRB.  Per , pt wears no O2 at [facility] JUAN, RN  09/22/20 0000

## 2020-09-22 NOTE — ED PROVIDER NOTES
[email or web address]  EMERGENCY DEPARTMENT ENCOUNTER   CHIEF COMPLAINT   Chief Complaint   Patient presents with    Shortness of Breath     possible aspiration, states emesis in bed      HPI   Dennys Cunningham is a [de-identified] y.o. female who presents with shortness of breath. The onset was this evening. Pt is from nursing home. Apparently this evening she was in her bed and she threw up and probably aspirated some of the vomit. She baseline does not have respiratory issues but now she is satting in the 80s on room air. She has Alzheimer's dementia and is nonverbal.  She is a DNR CC. I discussed her care with her  who wanted us to try to help her so we have decided to do antibiotics and admit her.     REVIEW OF SYSTEMS   Unable to dementia    PAST MEDICAL & SURGICAL HISTORY   Past Medical History:   Diagnosis Date    Alzheimer's dementia ([facility] [address]     Depression      Past Surgical History:   Procedure Laterality Date    APPENDECTOMY      EYE SURGERY      bilateral cataracts    KNEE ARTHROSCOPY        CURRENT MEDICATIONS   Current Outpatient Rx   Medication Sig Dispense Refill    busPIRone (BUSPAR) 30 MG tablet Take 30 mg by mouth 2 times daily      D-Mannose 500 MG CAPS Take 500 mg by mouth 2 times daily      divalproex (DEPAKOTE) 125 MG DR tablet Take 125 mg by mouth 3 times daily      docusate sodium (COLACE) 100 MG capsule Take 100 mg by mouth daily       acetaminophen (TYLENOL) 325 MG tablet Take 650 mg by mouth every 4 hours as needed for Pain AND 2 TABS BID      memantine ER (NAMENDA XR) 28 MG CP24 extended release capsule Take 28 mg by mouth daily      PARoxetine (PAXIL) 20 MG tablet Take 20 mg by mouth every morning      QUEtiapine (SEROQUEL) 25 MG tablet Take 25 mg by mouth 2 times daily       Cyanocobalamin (VITAMIN B-12) 1000 MCG extended release tablet Take 1,000 mcg by mouth daily      oxybutynin (DITROPAN-XL) 5 MG extended release tablet Take 5 mg by mouth daily      donepezil (ARICEPT ODT) 10 MG disintegrating tablet Take 10 mg by mouth daily      bisacodyl (DULCOLAX) 10 MG suppository Place 10 mg rectally daily as needed for Constipation Give on day 3 if MOM ineffective.  Sodium Phosphates (FLEET) 7-19 GM/118ML Place 1 enema rectally once as needed Daily as needed for bowel protocol.  magnesium hydroxide (MILK OF MAGNESIA) 400 MG/5ML suspension Take by mouth daily as needed for Constipation Take 1 ounce daily as needed for bowel protocol. ALLERGIES   Allergies   Allergen Reactions    Penicillins       SOCIAL & FAMILY HISTORY   Social History     Socioeconomic History    Marital status:      Spouse name: None    Number of children: None    Years of education: None    Highest education level: None   Occupational History    None   Social Needs    Financial resource strain: None    Food insecurity     Worry: None     Inability: None    Transportation needs     Medical: None     Non-medical: None   Tobacco Use    Smoking status: Former Smoker    Smokeless tobacco: Never Used   Substance and Sexual Activity    Alcohol use: No    Drug use: No    Sexual activity: None   Lifestyle    Physical activity     Days per week: None     Minutes per session: None    Stress: None   Relationships    Social connections     Talks on phone: None     Gets together: None     Attends Scientologist service: None     Active member of club or organization: None     Attends meetings of clubs or organizations: None     Relationship status: None    Intimate partner violence     Fear of current or ex partner: None     Emotionally abused: None     Physically abused: None     Forced sexual activity: None   Other Topics Concern    None   Social History Narrative    None     History reviewed. No pertinent family history.      PHYSICAL EXAM   VITAL SIGNS: BP (!) 155/63   Pulse 106   Temp 96.4 °F (35.8 °C) (Temporal)   Resp 27   SpO2 95%    Constitutional: elderly, ftt  HENT: Atraumatic, wet mucus membranes  Neck: supple, no JVD   Respiratory: Lungs reveal diminished lung sounds bilaterally, worse on right  Cardiovascular: tachy rate, no murmurs  GI: Soft, nontender, normal bowel sounds  Musculoskeletal: no acute deformities  Integument: Skin is warm and dry, no petechiae   Neurologic: Alert & oriented, normal speech  Psych: very blank affect, non verbal      RADIOLOGY/PROCEDURES   XR CHEST PORTABLE   Final Result   1. New bibasilar opacities, differential considerations to include aspiration   or bacterial pneumonia   2. Diffuse interstitial prominence, differential considerations include   interstitial edema versus infection   3. Nodular fullness of the right hilum, and may be due to adenopathy or   vascular enlargement. Elective CT imaging of the chest with contrast would   be helpful to further evaluate the hilum. ED COURSE & MEDICAL DECISION MAKING   Pertinent Labs & Imaging studies reviewed and interpreted. (See chart for details)     See electronic medical record for any other medications ordered. Vitals:    09/22/20 0115   BP: (!) 155/63   Pulse: 106   Resp: 27   Temp:    SpO2:    Differential Diagnosis: COPD exacerbation, foreign body aspiration, Congestive Heart Failure, Myocardial Infarction, Pulmonary Embolus, Pneumonia, Pneumothorax, other     MDM as per HPI patient will be admitted and be put on antibiotics    FINAL IMPRESSION   1.  Aspiration pneumonia of lower lobe, unspecified aspiration pneumonia type, unspecified laterality [facility])      PLAN  Admit  Electronically signed by: Mercedes Mata MD, 9/22/2020 1:25 AM  (This note was completed wt a voice recognition program)            Mercedes Mata MD  09/22/20 0573

## 2020-09-22 NOTE — PROGRESS NOTES
Patient was incontinent and changed and given a complete new linen. Zinc cream applied to patients reddened buttock. Patient continues to wear non rebreather mask and stats remain in the mid to low 90s. Will continue to monitor.

## 2020-09-22 NOTE — FLOWSHEET NOTE
met with NP and family to discuss goals of care. Pt is supported by her spouse and 2 adult children. Family was appropriately tearful and expressed anticipatory grief. Support and assurance provided. After much discussion and processing family made the decision to change pt's code status to [facility] and to consult [facility].  then escorted pt's spouse to her bedside and led him in a time of reminiscing. 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 Pt and spouse began dating in 9th grade and have been  for 62yrs. Spouse shared that pt really enjoys country/western music and that they used to belong to a square dancing club.  encouraged pt's spouse to enjoy this final time with his wife and suggested he spend some time listening to music with her. Family was very appreciative of support. Chaplains will remain available to offer spiritual and emotional support as needed.

## 2020-09-22 NOTE — DISCHARGE SUMMARY
Discharge Summary    Edgar Rsosi  :  1940  MRN:  [id number]    Admit date:  2020      Discharge date: 2020     Admitting Physician:  Beulah Martinez MD    Discharge Diagnoses:    Principal Problem:    Acute aspiration pneumonia [facility])  Active Problems:    Dementia of the Alzheimer's type with early onset with behavioral disturbance ([facility] [address]    Major depressive disorder with single episode, in partial remission ([facility] [address]    Acute respiratory failure with hypoxia ([facility] [address]  Resolved Problems:    * No resolved hospital problems. *      Hospital Course:   Edgar Rossi is a [de-identified] y.o. female admitted with Acute Aspiration PN. She is a [facility] resident from [facility]. She was sent to the ER for evaluation for sudden onset of SOB s/p emesis. She has an extensive history with dementia along with recurrent UTI. Upon evaluation she was found to be hypoxic and has bilateral pneumonia likely due to aspiration. She was admitted per the 's request and IV fluids and ATBs were provided. I had an extensive talk with the daughter and  of the patient today. She is in respiratory distress and unresponsive. They have agreed to [facility] at this time. I will discharge her back to [facility] with [facility] for comfort measures only. Consultants:  none    Procedures: none    Complications: none    Discharge Condition: poor    Exam:  GEN:  in severe distress and unresponsive elderly female  EYES: No gross abnormalities. , PERRL and EOMI  NECK: normal, supple, no lymphadenopathy,  no carotid bruits  PULM: wheezing present- throughout, rales present- throughout and rhonchi present- throughout  COR: regular rate & rhythm, no gallops, S1 normal and S2 normal  ABD:  soft, non-tender, non-distended, normal bowel sounds, no masses or organomegaly  EXT:   no cyanosis, clubbing or edema present    NEURO: unresponsive  SKIN:  no rashes or significant lesions    Significant Diagnostic Studies:   Lab Results   Component Plan:  Patient Active Problem List    Diagnosis Date Noted    Acute aspiration pneumonia ([facility] [address] 2020    Acute respiratory failure with hypoxia ([facility] 2020    Major depressive disorder with single episode, in partial remission ([facility] 2019    Syncope 2018    Dementia of the Alzheimer's type with early onset with behavioral disturbance ([facility] [address] 10/04/2018        Discharge Medications:       Jeremy Gabriel   Home Medication Instructions TD    Printed on:20 0036   Medication Information                      acetaminophen (TYLENOL) 325 MG tablet  Take 650 mg by mouth every 4 hours as needed for Pain AND 2 TABS BID             bisacodyl (DULCOLAX) 10 MG suppository  Place 10 mg rectally daily as needed for Constipation Give on day 3 if MOM ineffective. busPIRone (BUSPAR) 30 MG tablet  Take 30 mg by mouth 2 times daily             Cyanocobalamin (VITAMIN B-12) 1000 MCG extended release tablet  Take 1,000 mcg by mouth daily             D-Mannose 500 MG CAPS  Take 500 mg by mouth 2 times daily             divalproex (DEPAKOTE) 125 MG DR tablet  Take 125 mg by mouth 3 times daily             docusate sodium (COLACE) 100 MG capsule  Take 100 mg by mouth daily              donepezil (ARICEPT ODT) 10 MG disintegrating tablet  Take 10 mg by mouth daily             magnesium hydroxide (MILK OF MAGNESIA) 400 MG/5ML suspension  Take by mouth daily as needed for Constipation Take 1 ounce daily as needed for bowel protocol.              memantine ER (NAMENDA XR) 28 MG CP24 extended release capsule  Take 28 mg by mouth daily             oxybutynin (DITROPAN-XL) 5 MG extended release tablet  Take 5 mg by mouth daily             PARoxetine (PAXIL) 20 MG tablet  Take 20 mg by mouth every morning             QUEtiapine (SEROQUEL) 25 MG tablet  Take 25 mg by mouth 2 times daily              Sodium Phosphates (FLEET) 7-19 GM/118ML  Place 1 enema rectally once as needed Daily as needed for bowel protocol. Patient Instructions:    Activity: activity as tolerated  Diet: encourage fluids  Wound Care: none needed  Other: Hospice     Disposition:   DC to [facility] with [address]    Follow up:  Patient will be followed by Ck Fields MD in 1-2 weeks    CORE MEASURES on Discharge (if applicable)  ACE/ARB in CHF: NA  Statin in MI: NA  ASA in MI: NA  Statin in CVA: NA  Antiplatelet in CVA: NA    Total time spent on discharge services: 35 minutes    Including the following activities:  Evaluation and Management of patient  Discussion with patient and/or surrogate about current care plan  Coordination with Case Management and/or   Coordination of care with Consultants (if applicable)   Coordination of care with Receiving Facility Physician (if applicable)  Completion of DME forms (if applicable)  Preparation of Discharge Summary  Preparation of Medication Reconciliation  Preparation of Discharge Prescriptions    Signed:  KIA Santiago, NP-C  9/22/2020, 2:02 PM

## 2020-09-22 NOTE — PROGRESS NOTES
Pt resting in bed with eyes closed t/o shift, easily aroused. 11L NRB continues, RR improved after Morphine given, see MAR. [facility] [address] [facility] [address] [facility]. Family departed facility short time after speaking with NP & [place] and stated they will return at approx. 1300 to meet with Hospice. Pt continues labored breathing with some improvement. Will continue to monitor.

## 2020-09-22 NOTE — PROGRESS NOTES
Attempted to call report to nurse at [facility].  informed writer that nurse was not answering and she will have her call back. Nurse to receive pt is Leonel Wooten RN, at [facility].

## 2020-09-22 NOTE — PROGRESS NOTES
[facility] Herbal Suspension    To avoid potential drug interactions with herbal and nutritional supplements, it is the policy of [facility] to suspend all orders for these products at the time of admission.     Per hospital policy the following herbal/nutritional supplements were suspended during the hospital stay:    D-Mannose CAPS 500 mg    Thank you,  Gerald Schwartz, 9/22/2020, 10:00 AM

## 2020-09-22 NOTE — PROGRESS NOTES
Patient was admitted to the floor with no personal effects except her socks. Patient wearing a non rebreather at 11L. Patient will answer questions appropriately at random but not consistently. Patient is disoriented x4 but cooperative and pleasant. Assessment and vitals to be completed. Will continue to monitor and assess.

## 2020-09-22 NOTE — PLAN OF CARE
Problem: Falls - Risk of:  Goal: Will remain free from falls  Description: Will remain free from falls  9/22/2020 0542 by Priyanka Montemayor RN  Outcome: Ongoing  Note: Call light and bedside table remain in reach. Rails up x2 bed in lowest position. Bed alarm is on. Pt wears nonskid socks when standing or ambulating. Fall sign in place. Room remains free of clutter. 9/22/2020 0541 by Priyanka Montemayor RN  Outcome: Ongoing  Goal: Absence of physical injury  Description: Absence of physical injury  9/22/2020 0542 by Priyanka Montemayor RN  Outcome: Ongoing  9/22/2020 0541 by Priyanka Montemayor RN  Outcome: Ongoing     Problem: Skin Integrity:  Goal: Will show no infection signs and symptoms  Description: Will show no infection signs and symptoms  9/22/2020 0542 by Priyanka Montemayor RN  Outcome: Ongoing  Note: Labs are being drawn daily. Vitals with temperature are assessed twice each shift or PRN. Antibiotics are ordered. 9/22/2020 0541 by Priyanka Montemayor RN  Outcome: Ongoing  Goal: Absence of new skin breakdown  Description: Absence of new skin breakdown  9/22/2020 0542 by Priyanka Montemayor RN  Outcome: Ongoing  9/22/2020 0541 by Priyanka Montemayor RN  Outcome: Ongoing     Problem: Discharge Planning:  Goal: Discharged to appropriate level of care  Description: Discharged to appropriate level of care  Outcome: Ongoing  Note: Patient is being educated on medications and encouraged to ask questions regarding therapy. Discharge planning is in process. Patient to return back to nursing home when medically stable. Goal: Participates in care planning  Description: Participates in care planning  Outcome: Ongoing     Problem: Airway Clearance - Ineffective:  Goal: Clear lung sounds  Description: Clear lung sounds  Outcome: Ongoing  Note: Patient is reminded to use cough and deep breathing often. O2 therapy in use.     Goal: Ability to maintain a clear airway will improve  Description: Ability to maintain a clear airway will improve  Outcome: Ongoing     Problem: Fluid Volume - Deficit:  Goal: Achieves intake and output within specified parameters  Description: Achieves intake and output within specified parameters  Outcome: Ongoing  Note: Patient remains NPO at this time. Problem: Gas Exchange - Impaired:  Goal: Levels of oxygenation will improve  Description: Levels of oxygenation will improve  Outcome: Ongoing  Note: Lung sounds are assessed twice per shift and PRN. I&O is being monitored each shift. Patient is encouraged to cough and deep breathe. Vitals are monitored twice per shift and PRN. Problem: Hyperthermia:  Goal: Ability to maintain a body temperature in the normal range will improve  Description: Ability to maintain a body temperature in the normal range will improve  Outcome: Ongoing  Note: Labs are being drawn daily. Vitals with temperature are assessed twice each shift or PRN. Antibiotics are ordered.        Problem: Tobacco Use:  Goal: Will participate in inpatient tobacco-use cessation counseling  Description: Will participate in inpatient tobacco-use cessation counseling  Outcome: Ongoing

## 2020-09-22 NOTE — PROGRESS NOTES
called and ask to come in so MOHINDER Sosa, can speak to him and family, if possible.  states he will call his son to pick him up and they will be in this am. MOHINDER Sosa, aware.

## 2020-09-22 NOTE — PROGRESS NOTES
[facility] ambulance staff on unit to transport pt to [facility]. Discharge packet prepared and given to EMT for nursing staff at [facility]. [facility] pt tico well. RR 22 at time of transfer. Pt d/c'd off unit at this time, via ambulance. Belongings sent with EMT. No issues noted.

## 2020-09-22 NOTE — H&P
[facility]  History and Physical        Patient:  Holly Joe  MRN: [id number]    Chief Complaint:  Shortness of breath    History Obtained From:  electronic medical record, nursing staff at [facility]    PCP: Isaiah Rousseau MD    History of Present Illness: The patient is a [de-identified] y.o. female who presents with sudden onset of shortness of breath following vomiting. She has profound dementia, h/o recurrent uti. After episode of vomiting ,she became short of breath and hypoxic,came to er and has bilat pneumonia,mostlikely aspiration. she is [facility] , wants her to be treated and hence admitted. she is tachypneic and on 15/lit of oxygen. Past Medical History:        Diagnosis Date    Alzheimer's dementia ([address]     Depression        Past Surgical History:        Procedure Laterality Date    APPENDECTOMY      EYE SURGERY      bilateral cataracts    KNEE ARTHROSCOPY         Family History:   History reviewed. No pertinent family history. Social History:   TOBACCO:   reports that she has quit smoking. She has never used smokeless tobacco.  ETOH:   reports no history of alcohol use. OCCUPATION: none    Allergies:  Penicillins    Medications Prior to Admission:    Prior to Admission medications    Medication Sig Start Date End Date Taking?  Authorizing Provider   busPIRone (BUSPAR) 30 MG tablet Take 30 mg by mouth 2 times daily   Yes Historical Provider, MD   D-Mannose 500 MG CAPS Take 500 mg by mouth 2 times daily   Yes Historical Provider, MD   divalproex (DEPAKOTE) 125 MG DR tablet Take 125 mg by mouth 3 times daily   Yes Historical Provider, MD   docusate sodium (COLACE) 100 MG capsule Take 100 mg by mouth daily    Yes Historical Provider, MD   acetaminophen (TYLENOL) 325 MG tablet Take 650 mg by mouth every 4 hours as needed for Pain AND 2 TABS BID   Yes Historical Provider, MD   memantine ER (NAMENDA XR) 28 MG CP24 extended release capsule Take 28 mg by mouth daily   Yes Historical Provider, MD   PARoxetine (PAXIL) 20 MG tablet Take 20 mg by mouth every morning   Yes Historical Provider, MD   QUEtiapine (SEROQUEL) 25 MG tablet Take 25 mg by mouth 2 times daily    Yes Historical Provider, MD   Cyanocobalamin (VITAMIN B-12) 1000 MCG extended release tablet Take 1,000 mcg by mouth daily   Yes Historical Provider, MD   oxybutynin (DITROPAN-XL) 5 MG extended release tablet Take 5 mg by mouth daily   Yes Historical Provider, MD   donepezil (ARICEPT ODT) 10 MG disintegrating tablet Take 10 mg by mouth daily   Yes Historical Provider, MD   bisacodyl (DULCOLAX) 10 MG suppository Place 10 mg rectally daily as needed for Constipation Give on day 3 if MOM ineffective. Historical Provider, MD   Sodium Phosphates (FLEET) 7-19 GM/118ML Place 1 enema rectally once as needed Daily as needed for bowel protocol. Historical Provider, MD   magnesium hydroxide (MILK OF MAGNESIA) 400 MG/5ML suspension Take by mouth daily as needed for Constipation Take 1 ounce daily as needed for bowel protocol. Historical Provider, MD       Review of Systems:  Constitutional:negative  for fevers, and negative for chills. Eyes: negative for visual disturbance   ENT: negative for sore throat, negative nasal congestion, and negative for earache  Respiratory: positive for shortness of breath, positive for cough, and negative for wheezing  Cardiovascular: negative for chest pain, negative for palpitations, and negative for syncope  Gastrointestinal: negative for abdominal pain, negative for nausea,positive for vomiting, negative for diarrhea, negative for constipation, and negative for hematochezia or melena  Genitourinary:positive for recurent uti  Skin: negative for skin rash, and negative for skin lesions  Neurological: negative for unilateral weakness, numbness or tingling.   Psych- has dementia    Physical Exam:    Vitals:   Vitals:    09/22/20 0145   BP: (!) 152/46   Pulse: 100   Resp: 25   Temp: 97.6 °F (36.4 °C)   SpO2: 94%               GEN:  Obtunded,tachypneic,responds to painful stimulli  EYES: No gross abnormalities. NECK: normal, supple, no lymphadenopathy,  no carotid bruits  PULM: wheezing present- bilat, rales present- bilat and rhonchi present- bilat,tachypneic  COR: regular rate & rhythm and no gallops  ABD:  soft, non-tender, non-distended, normal bowel sounds, no masses or organomegaly  EXT:   no cyanosis, clubbing or edema present    NEURO: follows commands, PADILLA, no deficits  SKIN:  no rashes or significant lesions  -----------------------------------------------------------------  Diagnostic Data:   Lab Results   Component Value Date    WBC 11.8 (H) 09/22/2020    HGB 13.1 09/22/2020     09/22/2020       Lab Results   Component Value Date    BUN 28 (H) 09/22/2020    CREATININE 0.77 09/22/2020     09/22/2020    K 3.7 09/22/2020    CALCIUM 9.0 09/22/2020     09/22/2020    CO2 27 09/22/2020    LABGLOM >60 09/22/2020       Lab Results   Component Value Date    WBCUA GREATER THAN 100 03/31/2019    RBCUA 2 TO 5 03/31/2019    EPITHUA 5 TO 10 03/31/2019    LEUKOCYTESUR LARGE (A) 03/31/2019    SPECGRAV 1.015 03/31/2019    GLUCOSEU NEGATIVE 03/31/2019    KETUA NEGATIVE 03/31/2019    PROTEINU TRACE (A) 03/31/2019    HGBUR TRACE (A) 03/31/2019    CASTUA NOT REPORTED 03/31/2019    CRYSTUA NOT REPORTED 03/31/2019    BACTERIA 4+ (A) 03/31/2019    YEAST NOT REPORTED 03/31/2019       Lab Results   Component Value Date    TROPONINT <0.03 11/12/2018    PROBNP 170 11/12/2018       Xr Chest Portable    Result Date: 9/22/2020  EXAMINATION: ONE XRAY VIEW OF THE CHEST 9/22/2020 12:25 am COMPARISON: November 12, 2018 HISTORY: ORDERING SYSTEM PROVIDED HISTORY: aspiration pna suspected TECHNOLOGIST PROVIDED HISTORY: aspiration pna suspected FINDINGS: Marginal inspiration is noted. Diffuse interstitial changes are present, and may be due to infection or edema.   New opacities are seen within the mid to lower lung zones bilaterally, differential considerations include aspiration or bacterial pneumonia. Asymmetric elevation of the right hemidiaphragm is again noted. Heart size is stable. Nodular soft tissue fullness is seen within the right hilar region, and may represent adenopathy. Osseous structures are stable. .     1. New bibasilar opacities, differential considerations to include aspiration or bacterial pneumonia 2. Diffuse interstitial prominence, differential considerations include interstitial edema versus infection 3. Nodular fullness of the right hilum, and may be due to adenopathy or vascular enlargement. Elective CT imaging of the chest with contrast would be helpful to further evaluate the hilum. Assessment:    Principal Problem:    Acute aspiration pneumonia (HCC)  Active Problems:    Dementia of the Alzheimer's type with early onset with behavioral disturbance ([address]    Major depressive disorder with single episode, in partial remission ([address]    Acute respiratory failure with hypoxia ([address]  Resolved Problems:    * No resolved hospital problems.  *      Patient Active Problem List    Diagnosis Date Noted    Acute aspiration pneumonia ([address] 09/22/2020    Acute respiratory failure with hypoxia ([address] 09/22/2020    Major depressive disorder with single episode, in partial remission ([address] 01/16/2019    Syncope 11/12/2018    Dementia of the Alzheimer's type with early onset with behavioral disturbance ([address] 10/04/2018       Plan:     · This patient requires inpatient admission because of aspiration pneumonia requiring iv antibiotics,oxygen and werosols  · Factors affecting the medical complexity of this patient include dementia,acute respiratory failure with hypoxia,recurrent uti  · Estimated length of stay is 2 days  ·   · High risk medication monitoring: none    CORE MEASURES  DVT prophylaxis: Lovenox  Decubitus ulcer present on admission: No  CODE STATUS: DNR-CC  Nutrition Status: fair   Physical therapy: NA   Old Charts reviewed: Yes  EKG Reviewed: No  Advance Directive Addressed: Yes  Total time spent in evaluating this patient and formulating plan of care 25 minutes  Priyanka Arana MD  9/22/2020, 7:15 AM

## 2020-09-22 NOTE — PROGRESS NOTES
informed writer that pills are given crushed in applesauce at [facility]. Víctor Amaro CNP, notified of such and instructs not to give meds PO at this time d/t pt unable to follow commands and is high aspiration risk. All PO meds held at this this time, see MAR. Will continue to monitor.

## 2020-09-22 NOTE — PROGRESS NOTES
RESPIRATORY ASSESSMENT PROTOCOL                                                                                              Patient Name: Stephanie Lance Room#: [id number] : 1940     Admitting diagnosis: Acute aspiration pneumonia ([facility] [J69.0]       Medical History:   Past Medical History:   Diagnosis Date    Alzheimer's dementia ([facility]     Depression        PATIENT ASSESSMENT    LABORATORY DATA  Hematology:   Lab Results   Component Value Date    WBC 11.8 2020    RBC 4.18 2020    HGB 13.1 2020    HCT 41.0 2020     2020     Chemistry:  No results found for: PHART, TNC8PRB, PO2ART, B7KTDGQZ, GTR5CAD, PBEA    Blood Culture:   Sputum Culture:     VITALS  Pulse: 100   Resp: 25  BP: (!) 152/46  SpO2: 94 % O2 Device: Non-rebreather mask  Temp: 97.6 °F (36.4 °C)  Comment:   SKIN COLOR  [x] Normal  [] Pale  [] Dusky  [] Cyanotic      RESPIRATORY PATTERN  [] Normal  [] Dyspnea  [] Cheyne-Zambrano  [x] Kussmaul  [] Biots  AMBULATORY  [] Yes  [x] No  [] With Assistance    PEAK FLOW  Predicted:     Personal Best:        VITAL CAPACITY  Predicted value:  ml  Actual Value:  ml   30% of Predicted:  ml  Patient Acuity 0 1 2 3 4 Score   Level of Concious (LOC) [x]  Alert & Oriented or Pt normal LOC []  Confused;follows directions []  Confused & uncooper-ative []  Obtunded []  Comatose 0   Respiratory Rate  (RR) []  Reg. rate & pattern. 12 - 20 bpm  []  Increased RR. Greater than 20 bpm   []  SOB w/ exertion or RR greater than 24 bpm []  Access- ory muscle use at rest. Abn.  resp. [x]  SOB at rest.   4   Bilateral Breath Sounds (BBS) []  Clear []  Diminish-ed bases  []  Diminish-ed t/o, or rales   [x]  Sporadic, scattered wheezes or rhonchi []  Persistentwheezes and, or absent BBS 3   Cough [x]  Strong, effective, & non-prod. []  Effective & prod.  Less than 25 ml (2 TBSP) over past 24 hrs []  Ineffective & non-prod to less than 25 ML over past 24 hrs []  Ineffective and, or greater than 25 ml sputum prod. past 24 hrs. []  Nonspon- taneous; Requires suctioning 0   Pulmonary History  (PULM HX) []  No smoking and no chronic pulmonaryhistory []  Former smoker. Quit over 12 mos. ago []  Current smoker or quit w/ in 12 mos []  Pulm. History and, or 20 pk/yr smoking hx [x]  Admitted w/ acute pulm. dx and, or has been admitted w/ pulm. dx 2 or more times over past 12 mos 4   Surgical History this Admit  (SURG HX) [x]  No surgery []  General surgery []  Lower abdominal []  Thoracic or upper abdominal   []  Thoracic w/ pulm. disease 0   Chest X-Ray (CXR)/CT Scan []  Clear or not applicable []  Not available []  Atelect- asis or pleural effusions []  Localized infiltrate or pulm. edema [x]  Con-solidated Infiltrates, bilateral, or in more than 1 lobe 4   Slow or Forced VC, FEV1 OR PEFR (PULM FXN)  [x]  80% or greater, or not indicated []  Pt. unable to perform []  FEV1 or PEFR or VC 51-79%. []  FEV1 or PEFR or VC  30-49%   []  FEV1 or PEFR or VC less than 30%   0   TOTAL ACUITY: 15       CARE PLAN    If Acuity Level is 2, 3, or 4 in any of the following:    [x] BILATERAL BREATH SOUNDS (BBS)     [x] PULMONARY HISTORY (PULM HX)  [] PULMONARY FUNCTION (PULM FX)    Goal: Improve respiratory functions in patients with airway disease and decrease WOB    [x] AEROSOL PROTOCOL    Total Acuity:   16-32  []  Secondary Assessment in 24 hrs Total Acuity:  9-15  [x]  Secondary Assessment in 24 hrs Total Acuity:  4-8  []  Secondary Assessment in 48 hrs Total Acuity:  0-3  []  Secondary Assessment in 72 hrs   HHN AEROSOL THERAPY with  [physician-ordered bronchodilator(s)] q 4 & Albuterol PRN q2 hrs. Breath-Actuated Neb if BBS Acuity = 4, and pt. can use MP. Notify physician if condition deteriorates. HHN AEROSOL THERAPY with  [physician-ordered bronchodilator(s)]  QID and Albuterol PRN q4 hrs. Breath-Actuated Neb if BBS Acuity = 4, and pt. can use MP. Notify physician if condition deteriorates.  MDI THERAPY with  2 actuations of [physician-ordered bronchodilator(s)] via spacer TID Albuterol and PRNq4 hrs. If unable to utilize MDI: HHN [physician-ordered bronchodilator(s)] TID and Albuterol PRN q4 hrs. Notify physician if condition deteriorates. MDI THERAPY with  [physician-ordered bronchodilator(s)] via spacer TID PRN. If unable to utilize MDI: HHN [physician-ordered bronchodilator(s)] TID PRN. Notify physician if condition deteriorates. If Acuity Level is 2, 3, or 4 in any of the following:    [] COUGH     [] SURGICAL HISTORY (SURG HX)  [x] CHEST XRAY (CXR)    Goal: Improvement in sputum mobilization in patients with ineffective airway clearance. Reverse atelectasis. [x] Bronchopulmonary Hygiene Protocol    Total Acuity:   16-32  []  Secondary Assessment in 24 hrs Total Acuity:  9-15  [x]  Secondary Assessment in 24 hrs Total Acuity:  4-8  []  Secondary Assessment in 48 hrs Total Acuity:  0-3  []  Secondary Assessment in 72 hrs   METANEB QID with [physician-ordered bronchodilator(s)] if CXR Acuity = 4; otherwise:  PD&P, PEP, or Vest QID & PRN  NT Sxn PRN for ineffective cough  METANEB QID with [physician-ordered bronchodilator(s)] if CXR Acuity = 4; otherwise:  PD&P, PEP, or Vest TID & PRN  NT Sxn PRN for ineffective cough  Instruct patient to self-perform IS q1hr WA   Directed Cough self-performed q1hr WA     If Acuity Level is 2 or above in the following:    [] PULMONARY HISTORY (PULM HX)    Goal: Assist patient in quitting smoking to slow or stop the progression of lung disease.     [] Smoking Cessation Protocol    SMOKING CESSATION EDUCATION provided according to policy [id number]_822: (jefferson with an X)  ____Yes    ____ No     ____ NA    Smoking Cessation Booklet given:  ____Yes  ____No ____Patient Luis Daniel Began

## 2020-09-22 NOTE — PROGRESS NOTES
1700 9/22/20: Karen Membreno LPN, called back for report. All questions addressed. Pt has arrived at facility.

## 2020-09-22 NOTE — PROGRESS NOTES
Hospice RN informs transport will be her at approx 1530 to take pt back to [facility]

## 2020-09-22 NOTE — PROGRESS NOTES
Discussed discharge plan with the daughter. Patient is a [de-identified]year old female here with Acute aspiration pneumonia. She is alert and confused. Patient is  and lives at [place] [address] do to her dementia. Patient requires total assistance with her ADL's. The nursing home manages her medication and transportation. Her PCP is Paola Dueñas MD. She has medical insurance that helps with medication costs. The discharge plan is to return to [facility] with hospice care. The family wants [facility]. Hospice will be meeting with the family between 1 PM to 2 PM today. Patient has a DNR-CC and advance directives on file. LSW to monitor and assist with any needs or concerns as they arise. Referral made to [facility] and [facility] call to make aware of the discharge plan.     STEPAN Monte

## 2020-09-23 LAB
ESTIMATED AVERAGE GLUCOSE: 100 MG/DL
HBA1C MFR BLD: 5.1 % (ref 4–6)

## 2020-09-27 LAB
CULTURE: NORMAL
CULTURE: NORMAL
Lab: NORMAL
Lab: NORMAL
SPECIMEN DESCRIPTION: NORMAL
SPECIMEN DESCRIPTION: NORMAL